# Patient Record
Sex: FEMALE | Race: BLACK OR AFRICAN AMERICAN | Employment: UNEMPLOYED | URBAN - METROPOLITAN AREA
[De-identification: names, ages, dates, MRNs, and addresses within clinical notes are randomized per-mention and may not be internally consistent; named-entity substitution may affect disease eponyms.]

---

## 2017-01-18 ENCOUNTER — APPOINTMENT (OUTPATIENT)
Dept: GENERAL RADIOLOGY | Age: 47
End: 2017-01-18
Attending: EMERGENCY MEDICINE
Payer: COMMERCIAL

## 2017-01-18 ENCOUNTER — HOSPITAL ENCOUNTER (OUTPATIENT)
Age: 47
Setting detail: OBSERVATION
Discharge: HOME | End: 2017-01-19
Attending: EMERGENCY MEDICINE | Admitting: INTERNAL MEDICINE
Payer: COMMERCIAL

## 2017-01-18 DIAGNOSIS — R07.9 CHEST PAIN, UNSPECIFIED TYPE: Primary | ICD-10-CM

## 2017-01-18 PROBLEM — Z72.0 TOBACCO ABUSE: Chronic | Status: ACTIVE | Noted: 2017-01-18

## 2017-01-18 PROBLEM — I10 HTN (HYPERTENSION): Chronic | Status: ACTIVE | Noted: 2017-01-18

## 2017-01-18 PROBLEM — E11.9 DIABETES MELLITUS TYPE 2, DIET-CONTROLLED (HCC): Chronic | Status: ACTIVE | Noted: 2017-01-18

## 2017-01-18 PROBLEM — E78.5 DYSLIPIDEMIA: Chronic | Status: ACTIVE | Noted: 2017-01-18

## 2017-01-18 LAB
ALBUMIN SERPL BCP-MCNC: 3.6 G/DL (ref 3.5–5)
ALBUMIN/GLOB SERPL: 0.8 {RATIO} (ref 1.2–3.5)
ALP SERPL-CCNC: 131 U/L (ref 50–136)
ALT SERPL-CCNC: 37 U/L (ref 12–65)
ANION GAP BLD CALC-SCNC: 8 MMOL/L (ref 7–16)
AST SERPL W P-5'-P-CCNC: 33 U/L (ref 15–37)
ATRIAL RATE: 69 BPM
BACTERIA URNS QL MICRO: ABNORMAL /HPF
BASOPHILS # BLD AUTO: 0 K/UL (ref 0–0.2)
BASOPHILS # BLD: 0 % (ref 0–2)
BILIRUB SERPL-MCNC: 0.5 MG/DL (ref 0.2–1.1)
BUN SERPL-MCNC: 10 MG/DL (ref 6–23)
CALCIUM SERPL-MCNC: 9.1 MG/DL (ref 8.3–10.4)
CALCULATED P AXIS, ECG09: 9 DEGREES
CALCULATED R AXIS, ECG10: -9 DEGREES
CALCULATED T AXIS, ECG11: 19 DEGREES
CASTS URNS QL MICRO: ABNORMAL /LPF
CHLORIDE SERPL-SCNC: 103 MMOL/L (ref 98–107)
CK SERPL-CCNC: 89 U/L (ref 21–215)
CO2 SERPL-SCNC: 27 MMOL/L (ref 21–32)
CREAT SERPL-MCNC: 0.92 MG/DL (ref 0.6–1)
D DIMER PPP FEU-MCNC: 0.27 UG/ML(FEU)
DIAGNOSIS, 93000: NORMAL
DIASTOLIC BP, ECG02: NORMAL MMHG
DIFFERENTIAL METHOD BLD: ABNORMAL
EOSINOPHIL # BLD: 0.4 K/UL (ref 0–0.8)
EOSINOPHIL NFR BLD: 3 % (ref 0.5–7.8)
EPI CELLS #/AREA URNS HPF: ABNORMAL /HPF
ERYTHROCYTE [DISTWIDTH] IN BLOOD BY AUTOMATED COUNT: 14.1 % (ref 11.9–14.6)
EST. AVERAGE GLUCOSE BLD GHB EST-MCNC: 137 MG/DL
GLOBULIN SER CALC-MCNC: 4.4 G/DL (ref 2.3–3.5)
GLUCOSE SERPL-MCNC: 110 MG/DL (ref 65–100)
HBA1C MFR BLD: 6.4 % (ref 4.8–6)
HCT VFR BLD AUTO: 44 % (ref 35.8–46.3)
HETEROPH AB SER QL: NEGATIVE
HGB BLD-MCNC: 14.3 G/DL (ref 11.7–15.4)
IMM GRANULOCYTES # BLD: 0 K/UL (ref 0–0.5)
IMM GRANULOCYTES NFR BLD AUTO: 0 % (ref 0–5)
LYMPHOCYTES # BLD AUTO: 44 % (ref 13–44)
LYMPHOCYTES # BLD: 5.9 K/UL (ref 0.5–4.6)
MCH RBC QN AUTO: 27.4 PG (ref 26.1–32.9)
MCHC RBC AUTO-ENTMCNC: 32.5 G/DL (ref 31.4–35)
MCV RBC AUTO: 84.5 FL (ref 79.6–97.8)
MONOCYTES # BLD: 0.9 K/UL (ref 0.1–1.3)
MONOCYTES NFR BLD AUTO: 7 % (ref 4–12)
NEUTS SEG # BLD: 6.3 K/UL (ref 1.7–8.2)
NEUTS SEG NFR BLD AUTO: 46 % (ref 43–78)
P-R INTERVAL, ECG05: 158 MS
PLATELET # BLD AUTO: 484 K/UL (ref 150–450)
PLATELET COMMENTS,PCOM: SLIGHT
PMV BLD AUTO: 10.2 FL (ref 10.8–14.1)
POTASSIUM SERPL-SCNC: 4.9 MMOL/L (ref 3.5–5.1)
PROT SERPL-MCNC: 8 G/DL (ref 6.3–8.2)
Q-T INTERVAL, ECG07: 392 MS
QRS DURATION, ECG06: 86 MS
QTC CALCULATION (BEZET), ECG08: 420 MS
RBC # BLD AUTO: 5.21 M/UL (ref 4.05–5.25)
RBC #/AREA URNS HPF: ABNORMAL /HPF
RBC MORPH BLD: ABNORMAL
SODIUM SERPL-SCNC: 138 MMOL/L (ref 136–145)
SYSTOLIC BP, ECG01: NORMAL MMHG
TROPONIN I SERPL-MCNC: <0.02 NG/ML (ref 0.02–0.05)
TSH SERPL DL<=0.005 MIU/L-ACNC: 1.02 UIU/ML (ref 0.36–3.74)
VENTRICULAR RATE, ECG03: 69 BPM
WBC # BLD AUTO: 13.5 K/UL (ref 4.3–11.1)
WBC MORPH BLD: ABNORMAL
WBC URNS QL MICRO: ABNORMAL /HPF

## 2017-01-18 PROCEDURE — 99152 MOD SED SAME PHYS/QHP 5/>YRS: CPT

## 2017-01-18 PROCEDURE — 99153 MOD SED SAME PHYS/QHP EA: CPT

## 2017-01-18 PROCEDURE — 80053 COMPREHEN METABOLIC PANEL: CPT | Performed by: EMERGENCY MEDICINE

## 2017-01-18 PROCEDURE — 93458 L HRT ARTERY/VENTRICLE ANGIO: CPT

## 2017-01-18 PROCEDURE — 74011250636 HC RX REV CODE- 250/636: Performed by: INTERNAL MEDICINE

## 2017-01-18 PROCEDURE — 81003 URINALYSIS AUTO W/O SCOPE: CPT | Performed by: PHYSICIAN ASSISTANT

## 2017-01-18 PROCEDURE — 85379 FIBRIN DEGRADATION QUANT: CPT | Performed by: PHYSICIAN ASSISTANT

## 2017-01-18 PROCEDURE — 85025 COMPLETE CBC W/AUTO DIFF WBC: CPT | Performed by: EMERGENCY MEDICINE

## 2017-01-18 PROCEDURE — 99285 EMERGENCY DEPT VISIT HI MDM: CPT | Performed by: PHYSICIAN ASSISTANT

## 2017-01-18 PROCEDURE — 74011250636 HC RX REV CODE- 250/636

## 2017-01-18 PROCEDURE — 74011000250 HC RX REV CODE- 250: Performed by: INTERNAL MEDICINE

## 2017-01-18 PROCEDURE — 93005 ELECTROCARDIOGRAM TRACING: CPT | Performed by: EMERGENCY MEDICINE

## 2017-01-18 PROCEDURE — 81015 MICROSCOPIC EXAM OF URINE: CPT | Performed by: PHYSICIAN ASSISTANT

## 2017-01-18 PROCEDURE — 82550 ASSAY OF CK (CPK): CPT | Performed by: PHYSICIAN ASSISTANT

## 2017-01-18 PROCEDURE — 77030004534 HC CATH ANGI DX INFN CARD -A

## 2017-01-18 PROCEDURE — 83036 HEMOGLOBIN GLYCOSYLATED A1C: CPT | Performed by: PHYSICIAN ASSISTANT

## 2017-01-18 PROCEDURE — 84443 ASSAY THYROID STIM HORMONE: CPT | Performed by: PHYSICIAN ASSISTANT

## 2017-01-18 PROCEDURE — C1769 GUIDE WIRE: HCPCS

## 2017-01-18 PROCEDURE — 86308 HETEROPHILE ANTIBODY SCREEN: CPT | Performed by: EMERGENCY MEDICINE

## 2017-01-18 PROCEDURE — 36415 COLL VENOUS BLD VENIPUNCTURE: CPT | Performed by: PHYSICIAN ASSISTANT

## 2017-01-18 PROCEDURE — 77030029997 HC DEV COM RDL R BND TELE -B

## 2017-01-18 PROCEDURE — 74011250637 HC RX REV CODE- 250/637: Performed by: INTERNAL MEDICINE

## 2017-01-18 PROCEDURE — C1894 INTRO/SHEATH, NON-LASER: HCPCS

## 2017-01-18 PROCEDURE — 77030015766

## 2017-01-18 PROCEDURE — 84484 ASSAY OF TROPONIN QUANT: CPT | Performed by: EMERGENCY MEDICINE

## 2017-01-18 PROCEDURE — 99218 HC RM OBSERVATION: CPT

## 2017-01-18 PROCEDURE — 71020 XR CHEST PA LAT: CPT

## 2017-01-18 PROCEDURE — 74011636320 HC RX REV CODE- 636/320: Performed by: INTERNAL MEDICINE

## 2017-01-18 RX ORDER — ONDANSETRON 2 MG/ML
4 INJECTION INTRAMUSCULAR; INTRAVENOUS
Status: DISCONTINUED | OUTPATIENT
Start: 2017-01-18 | End: 2017-01-19 | Stop reason: HOSPADM

## 2017-01-18 RX ORDER — NITROGLYCERIN 0.4 MG/1
0.4 TABLET SUBLINGUAL
Status: DISCONTINUED | OUTPATIENT
Start: 2017-01-18 | End: 2017-01-19 | Stop reason: HOSPADM

## 2017-01-18 RX ORDER — SODIUM CHLORIDE 0.9 % (FLUSH) 0.9 %
5-10 SYRINGE (ML) INJECTION AS NEEDED
Status: DISCONTINUED | OUTPATIENT
Start: 2017-01-18 | End: 2017-01-19 | Stop reason: HOSPADM

## 2017-01-18 RX ORDER — LIDOCAINE HYDROCHLORIDE 20 MG/ML
2-10 INJECTION, SOLUTION INFILTRATION; PERINEURAL
Status: DISCONTINUED | OUTPATIENT
Start: 2017-01-18 | End: 2017-01-19 | Stop reason: HOSPADM

## 2017-01-18 RX ORDER — OXYCODONE HYDROCHLORIDE 5 MG/1
15 TABLET ORAL
Status: DISCONTINUED | OUTPATIENT
Start: 2017-01-18 | End: 2017-01-19 | Stop reason: HOSPADM

## 2017-01-18 RX ORDER — HEPARIN SODIUM 200 [USP'U]/100ML
3 INJECTION, SOLUTION INTRAVENOUS CONTINUOUS
Status: DISCONTINUED | OUTPATIENT
Start: 2017-01-18 | End: 2017-01-18 | Stop reason: SDUPTHER

## 2017-01-18 RX ORDER — HYDROCODONE BITARTRATE AND ACETAMINOPHEN 5; 325 MG/1; MG/1
1 TABLET ORAL
Status: DISCONTINUED | OUTPATIENT
Start: 2017-01-18 | End: 2017-01-19 | Stop reason: HOSPADM

## 2017-01-18 RX ORDER — METOPROLOL SUCCINATE 50 MG/1
50 TABLET, EXTENDED RELEASE ORAL 2 TIMES DAILY
Status: DISCONTINUED | OUTPATIENT
Start: 2017-01-18 | End: 2017-01-19 | Stop reason: HOSPADM

## 2017-01-18 RX ORDER — FENTANYL CITRATE 50 UG/ML
25-75 INJECTION, SOLUTION INTRAMUSCULAR; INTRAVENOUS
Status: DISCONTINUED | OUTPATIENT
Start: 2017-01-18 | End: 2017-01-19 | Stop reason: HOSPADM

## 2017-01-18 RX ORDER — CYCLOBENZAPRINE HCL 10 MG
10 TABLET ORAL
Status: DISCONTINUED | OUTPATIENT
Start: 2017-01-18 | End: 2017-01-19 | Stop reason: HOSPADM

## 2017-01-18 RX ORDER — CITALOPRAM 10 MG/1
10 TABLET ORAL DAILY
Status: DISCONTINUED | OUTPATIENT
Start: 2017-01-19 | End: 2017-01-19 | Stop reason: HOSPADM

## 2017-01-18 RX ORDER — SODIUM CHLORIDE 9 MG/ML
100 INJECTION, SOLUTION INTRAVENOUS CONTINUOUS
Status: DISCONTINUED | OUTPATIENT
Start: 2017-01-18 | End: 2017-01-18 | Stop reason: SDUPTHER

## 2017-01-18 RX ORDER — MIDAZOLAM HYDROCHLORIDE 1 MG/ML
.5-2 INJECTION, SOLUTION INTRAMUSCULAR; INTRAVENOUS
Status: DISCONTINUED | OUTPATIENT
Start: 2017-01-18 | End: 2017-01-19 | Stop reason: HOSPADM

## 2017-01-18 RX ORDER — ACETAMINOPHEN 325 MG/1
650 TABLET ORAL
Status: DISCONTINUED | OUTPATIENT
Start: 2017-01-18 | End: 2017-01-19 | Stop reason: HOSPADM

## 2017-01-18 RX ORDER — GUAIFENESIN 100 MG/5ML
81 LIQUID (ML) ORAL DAILY
Status: DISCONTINUED | OUTPATIENT
Start: 2017-01-19 | End: 2017-01-19 | Stop reason: HOSPADM

## 2017-01-18 RX ORDER — PANTOPRAZOLE SODIUM 40 MG/1
40 TABLET, DELAYED RELEASE ORAL
Status: DISCONTINUED | OUTPATIENT
Start: 2017-01-19 | End: 2017-01-19 | Stop reason: HOSPADM

## 2017-01-18 RX ORDER — SODIUM CHLORIDE 9 MG/ML
75 INJECTION, SOLUTION INTRAVENOUS CONTINUOUS
Status: DISCONTINUED | OUTPATIENT
Start: 2017-01-18 | End: 2017-01-19 | Stop reason: HOSPADM

## 2017-01-18 RX ORDER — DICLOFENAC SODIUM 75 MG/1
75 TABLET, DELAYED RELEASE ORAL 2 TIMES DAILY
Status: DISCONTINUED | OUTPATIENT
Start: 2017-01-18 | End: 2017-01-19 | Stop reason: HOSPADM

## 2017-01-18 RX ORDER — GABAPENTIN 300 MG/1
300 CAPSULE ORAL
Status: DISCONTINUED | OUTPATIENT
Start: 2017-01-18 | End: 2017-01-19 | Stop reason: HOSPADM

## 2017-01-18 RX ORDER — SODIUM CHLORIDE 0.9 % (FLUSH) 0.9 %
5-10 SYRINGE (ML) INJECTION EVERY 8 HOURS
Status: DISCONTINUED | OUTPATIENT
Start: 2017-01-18 | End: 2017-01-18 | Stop reason: SDUPTHER

## 2017-01-18 RX ORDER — SODIUM CHLORIDE 0.9 % (FLUSH) 0.9 %
5-10 SYRINGE (ML) INJECTION EVERY 8 HOURS
Status: DISCONTINUED | OUTPATIENT
Start: 2017-01-18 | End: 2017-01-19 | Stop reason: HOSPADM

## 2017-01-18 RX ADMIN — IOVERSOL 80 ML: 741 INJECTION INTRA-ARTERIAL; INTRAVENOUS at 18:20

## 2017-01-18 RX ADMIN — LIDOCAINE HYDROCHLORIDE 60 MG: 20 INJECTION, SOLUTION INFILTRATION; PERINEURAL at 18:12

## 2017-01-18 RX ADMIN — Medication 10 ML: at 19:40

## 2017-01-18 RX ADMIN — HEPARIN SODIUM 3 UNITS/HR: 200 INJECTION, SOLUTION INTRAVENOUS at 17:58

## 2017-01-18 RX ADMIN — Medication 10 ML: at 15:59

## 2017-01-18 RX ADMIN — SODIUM CHLORIDE 100 ML/HR: 900 INJECTION, SOLUTION INTRAVENOUS at 15:59

## 2017-01-18 RX ADMIN — FENTANYL CITRATE 50 MCG: 50 INJECTION, SOLUTION INTRAMUSCULAR; INTRAVENOUS at 18:06

## 2017-01-18 RX ADMIN — GABAPENTIN 300 MG: 300 CAPSULE ORAL at 19:32

## 2017-01-18 RX ADMIN — CYCLOBENZAPRINE HYDROCHLORIDE 10 MG: 10 TABLET, FILM COATED ORAL at 19:31

## 2017-01-18 RX ADMIN — MIDAZOLAM HYDROCHLORIDE 2 MG: 1 INJECTION, SOLUTION INTRAMUSCULAR; INTRAVENOUS at 18:06

## 2017-01-18 RX ADMIN — SODIUM CHLORIDE 75 ML/HR: 900 INJECTION, SOLUTION INTRAVENOUS at 19:00

## 2017-01-18 RX ADMIN — OXYCODONE HYDROCHLORIDE 15 MG: 5 TABLET ORAL at 19:29

## 2017-01-18 RX ADMIN — METOPROLOL SUCCINATE 50 MG: 50 TABLET, EXTENDED RELEASE ORAL at 19:31

## 2017-01-18 RX ADMIN — MIDAZOLAM HYDROCHLORIDE 1 MG: 1 INJECTION, SOLUTION INTRAMUSCULAR; INTRAVENOUS at 18:12

## 2017-01-18 RX ADMIN — HEPARIN SODIUM 2 ML: 10000 INJECTION, SOLUTION INTRAVENOUS; SUBCUTANEOUS at 18:13

## 2017-01-18 NOTE — PROGRESS NOTES
Cath lab called to let us know they were putting in for transport on patient. Informed them that the patient has a infiltrated IV site and the primary is trying to start a line. The patient currently has no IV access.

## 2017-01-18 NOTE — ED TRIAGE NOTES
Per patient intermittent left sided chest pain since this Saturday that radiates through her back. Patient states she took 81mg asa today.

## 2017-01-18 NOTE — PROCEDURES
Brief Cardiac Procedure Note    Patient: Amaury Gonzalez MRN: [id number]  SSN: [id number]    YOB: 1970  Age: 40 y.o. Sex: female      Date of Procedure: 1/18/2017     Pre-procedure Diagnosis: Chest pain    Post-procedure Diagnosis: Non-cardiac chest pain    Procedure: Left Heart Catheterization    Brief Description of Procedure: As above    Performed By: Rui Kaminski MD     Assistants: None    Anesthesia: Moderate Sedation    Estimated Blood Loss: Less than 10 mL      Specimens: None    Implants: None    Findings: Normal coronary arteries    Complications: None    Recommendations: Continue medical therapy.     Signed By: Rui Kaminski MD     January 18, 2017

## 2017-01-18 NOTE — ROUTINE PROCESS
TRANSFER - OUT REPORT:    [facility]  Dr. Banerjee Members  RRA access    Versed 3mg, fentanyl 50mcg  Diagnostic cath, medical management  R band placed @ [phone] with 11ml air  Pt encouraged to stop smoking    Verbal report given to cvsd RN(name) on Toya Leong  being transferred to [facility](unit) for ordered procedure       Report consisted of patients Situation, Background, Assessment and   Recommendations(SBAR). Information from the following report(s) Procedure Summary was reviewed with the receiving nurse. Lines:       Opportunity for questions and clarification was provided.       Patient transported with:   Registered Nurse  Tech

## 2017-01-18 NOTE — IP AVS SNAPSHOT
Current Discharge Medication List  
  
Take these medications at their scheduled times Dose & Instructions Dispensing Information Comments Morning Noon Evening Bedtime CeleXA 10 mg tablet Generic drug:  citalopram  
   
Your next dose is: Today, Tomorrow Other:  ____________ Dose:  10 mg Take 10 mg by mouth daily. Refills:  0  
     
   
   
   
  
 diclofenac EC 75 mg EC tablet Commonly known as:  VOLTAREN Your next dose is: Today, Tomorrow Other:  ____________ Dose:  75 mg Take 1 Tab by mouth two (2) times a day. Quantity:  20 Tab Refills:  0  
     
   
   
   
  
 hydroCHLOROthiazide 25 mg tablet Commonly known as:  HYDRODIURIL Your next dose is: Today, Tomorrow Other:  ____________ Dose:  25 mg Take 25 mg by mouth daily. Refills:  0 NexIUM 20 mg capsule Generic drug:  esomeprazole Your next dose is: Today, Tomorrow Other:  ____________ Dose:  20 mg Take 20 mg by mouth daily. Refills:  0  
     
   
   
   
  
 TOPROL  mg tablet Generic drug:  metoprolol succinate Your next dose is: Today, Tomorrow Other:  ____________ Dose:  50 mg Take 50 mg by mouth two (2) times a day. Refills:  0 Take these medications as needed Dose & Instructions Dispensing Information Comments Morning Noon Evening Bedtime FLEXERIL 10 mg tablet Generic drug:  cyclobenzaprine Your next dose is: Today, Tomorrow Other:  ____________ Dose:  10 mg Take 10 mg by mouth three (3) times daily as needed for Muscle Spasm(s). Refills:  0  
     
   
   
   
  
 gabapentin 300 mg capsule Commonly known as:  NEURONTIN Your next dose is: Today, Tomorrow Other:  ____________ Dose:  300 mg Take 300 mg by mouth nightly as needed. Refills:  0 oxyCODONE IR 15 mg immediate release tablet Commonly known as:  OXY-IR Your next dose is: Today, Tomorrow Other:  ____________ Dose:  15 mg Take 15 mg by mouth four (4) times daily as needed for Pain. Refills:  0

## 2017-01-18 NOTE — ED PROVIDER NOTES
HPI Comments: Patient is here with left sided chest pain and pressure that started on Saturday, 01/14/17, 4 days ago. She states it runs into her left arm and up into her neck. She has had diaphoresis with it. It started again this am at 8 and again when she was in the waiting room here but is resolved now. 11/21/14 Patient had an SA node ablation by Dr. José Miguel Carroll and has had very few symptoms since then with her \"heart racing. \" She states they told her that her thyroid was normal and her PCP is Dr. Yosi Gar. She is not having any fever, abdominal pain, nausea, vomiting, chills, swelling of her arms or legs or weakness. She was ambulatory to the stretcher without difficulty. Patients father and 2 1/2 brothers have had MI's, mother had some \"mild heart disease but no surgery. \"    Patient is a 40 y.o. female presenting with chest pain. The history is provided by the patient. Chest Pain (Angina)    Associated symptoms include diaphoresis. Pertinent negatives include no cough, no fever, no palpitations and no shortness of breath.         Past Medical History:   Diagnosis Date    CAD (coronary artery disease)      fast heart rate     Chronic pain      Back    Diabetes (HCC)      type 2, aver= 135- feels badlyat low -90--- high 200 - resolved with weight loss    GERD (gastroesophageal reflux disease)     Hypertension     Ill-defined condition      DDD    Other ill-defined conditions(799.89)      pain rt breast    Other ill-defined conditions(799.89)      chronic back pain    Psychiatric disorder     V tach ([facility] [address]        Past Surgical History:   Procedure Laterality Date    Hx tubal ligation      Hx orthopaedic       left hand  ganglion    Pr breast surgery procedure unlisted       excision right breast ducts         Family History:   Problem Relation Age of Onset    Diabetes Mother     Pseudocholinesterase Deficiency Neg Hx     Malignant Hyperthermia Neg Hx     Delayed Awakening Neg Hx     Post-op Nausea/Vomiting Neg Hx     Emergence Delirium Neg Hx     Post-op Cognitive Dysfunction Neg Hx     Other Neg Hx     Breast Cancer Maternal Aunt 28       Social History     Social History    Marital status:      Spouse name: N/A    Number of children: N/A    Years of education: N/A     Occupational History    Not on file. Social History Main Topics    Smoking status: Current Every Day Smoker     Packs/day: 0.50     Years: 15.00    Smokeless tobacco: Never Used    Alcohol use No    Drug use: No    Sexual activity: Not on file     Other Topics Concern    Not on file     Social History Narrative         ALLERGIES: Naproxen and Ultram [tramadol]    Review of Systems   Constitutional: Positive for diaphoresis. Negative for chills and fever. HENT: Negative. Eyes: Negative. Respiratory: Negative. Negative for apnea, cough, choking, chest tightness, shortness of breath, wheezing and stridor. Cardiovascular: Positive for chest pain. Negative for palpitations and leg swelling. Gastrointestinal: Negative. Genitourinary: Negative. Musculoskeletal: Negative. Skin: Negative. Neurological: Negative. Psychiatric/Behavioral: Negative. All other systems reviewed and are negative. Vitals:    01/18/17 0941   BP: 140/68   Pulse: 71   Resp: 16   Temp: 98.3 °F (36.8 °C)   SpO2: 98%   Weight: 99.8 kg (220 lb)   Height: 5' 6\" (1.676 m)            Physical Exam   Constitutional: She is oriented to person, place, and time. She appears well-developed and well-nourished. HENT:   Head: Normocephalic and atraumatic. Right Ear: External ear normal.   Left Ear: External ear normal.   Nose: Nose normal.   Mouth/Throat: Oropharynx is clear and moist.   Eyes: Conjunctivae and EOM are normal. Pupils are equal, round, and reactive to light. Neck: Normal range of motion. Neck supple. Cardiovascular: Normal rate, regular rhythm, normal heart sounds and intact distal pulses. Pulmonary/Chest: Effort normal and breath sounds normal. No respiratory distress. She has no wheezes. She has no rales. She exhibits no tenderness. Abdominal: Soft. Bowel sounds are normal.   Musculoskeletal: Normal range of motion. She exhibits no edema. Neurological: She is alert and oriented to person, place, and time. She has normal reflexes. Skin: Skin is warm and dry. No erythema. Psychiatric: She has a normal mood and affect. Her behavior is normal. Judgment and thought content normal.   Nursing note and vitals reviewed. MDM  Number of Diagnoses or Management Options     Amount and/or Complexity of Data Reviewed  Clinical lab tests: ordered and reviewed  Tests in the radiology section of CPT®: ordered and reviewed  Discuss the patient with other providers: yes (Dr. Cece Paulson)    Risk of Complications, Morbidity, and/or Mortality  Presenting problems: moderate  Diagnostic procedures: moderate  Management options: moderate      ED Course       Procedures    12:12 PM Spoke with Dr. Cece Paulson regarding patient. He went to see patient as well. 1:45 PM Cardiology was paged and they are here to see patient. The patient was observed in the ED.     Results Reviewed:      Recent Results (from the past 24 hour(s))   EKG, 12 LEAD, INITIAL    Collection Time: 01/18/17  9:41 AM   Result Value Ref Range    Systolic BP  mmHg    Diastolic BP  mmHg    Ventricular Rate 69 BPM    Atrial Rate 69 BPM    P-R Interval 158 ms    QRS Duration 86 ms    Q-T Interval 392 ms    QTC Calculation (Bezet) 420 ms    Calculated P Axis 9 degrees    Calculated R Axis -9 degrees    Calculated T Axis 19 degrees    Diagnosis Normal sinus rhythm  Normal ECG      CBC WITH AUTOMATED DIFF    Collection Time: 01/18/17  9:45 AM   Result Value Ref Range    WBC 13.5 (H) 4.3 - 11.1 K/uL    RBC 5.21 4.05 - 5.25 M/uL    HGB 14.3 11.7 - 15.4 g/dL    HCT 44.0 35.8 - 46.3 %    MCV 84.5 79.6 - 97.8 FL    MCH 27.4 26.1 - 32.9 PG    MCHC 32.5 31.4 - 35.0 g/dL    RDW 14.1 11.9 - 14.6 %    PLATELET 462 (H) 087 - 450 K/uL    MPV 10.2 (L) 10.8 - 14.1 FL    NEUTROPHILS 46 43 - 78 %    LYMPHOCYTES 44 13 - 44 %    MONOCYTES 7 4.0 - 12.0 %    EOSINOPHILS 3 0.5 - 7.8 %    BASOPHILS 0 0.0 - 2.0 %    IMMATURE GRANULOCYTES 0 0.0 - 5.0 %    ABS. NEUTROPHILS 6.3 1.7 - 8.2 K/UL    ABS. LYMPHOCYTES 5.9 (H) 0.5 - 4.6 K/UL    ABS. MONOCYTES 0.9 0.1 - 1.3 K/UL    ABS. EOSINOPHILS 0.4 0.0 - 0.8 K/UL    ABS. BASOPHILS 0.0 0.0 - 0.2 K/UL    ABS. IMM. GRANS. 0.0 0.0 - 0.5 K/UL    RBC COMMENTS NORMOCYTIC/NORMOCHROMIC      WBC COMMENTS ATYPICAL LYMPHOCYTES PRESENT      PLATELET COMMENTS SLIGHT      DF AUTOMATED     METABOLIC PANEL, COMPREHENSIVE    Collection Time: 01/18/17  9:45 AM   Result Value Ref Range    Sodium 138 136 - 145 mmol/L    Potassium 4.9 3.5 - 5.1 mmol/L    Chloride 103 98 - 107 mmol/L    CO2 27 21 - 32 mmol/L    Anion gap 8 7 - 16 mmol/L    Glucose 110 (H) 65 - 100 mg/dL    BUN 10 6 - 23 MG/DL    Creatinine 0.92 0.6 - 1.0 MG/DL    GFR est AA >60 >60 ml/min/1.73m2    GFR est non-AA >60 >60 ml/min/1.73m2    Calcium 9.1 8.3 - 10.4 MG/DL    Bilirubin, total 0.5 0.2 - 1.1 MG/DL    ALT 37 12 - 65 U/L    AST 33 15 - 37 U/L    Alk.  phosphatase 131 50 - 136 U/L    Protein, total 8.0 6.3 - 8.2 g/dL    Albumin 3.6 3.5 - 5.0 g/dL    Globulin 4.4 (H) 2.3 - 3.5 g/dL    A-G Ratio 0.8 (L) 1.2 - 3.5     TROPONIN I    Collection Time: 01/18/17  9:45 AM   Result Value Ref Range    Troponin-I, Qt. <0.02 (L) 0.02 - 0.05 NG/ML   D DIMER    Collection Time: 01/18/17  9:45 AM   Result Value Ref Range    D DIMER 0.27 <0.55 ug/ml(FEU)   TSH 3RD GENERATION    Collection Time: 01/18/17  9:45 AM   Result Value Ref Range    TSH 1.020 0.358 - 3.740 uIU/mL   URINE MICROSCOPIC    Collection Time: 01/18/17 11:55 AM   Result Value Ref Range    WBC 5-10 0 /hpf    RBC 5-10 0 /hpf    Epithelial cells 5-10 0 /hpf    Bacteria 1+ (H) 0 /hpf    Casts 0-3 0 /lpf   TROPONIN I    Collection Time: 01/18/17 12:38 PM   Result Value Ref Range    Troponin-I, Qt. <0.02 (L) 0.02 - 0.05 NG/ML   MONONUCLEOSIS SCREEN    Collection Time: 01/18/17 12:38 PM   Result Value Ref Range    Mononucleosis screen NEGATIVE  NEG       Patient admitted by cardiology.

## 2017-01-18 NOTE — IP AVS SNAPSHOT
Francia Volodymyr 
 
 
 [address] [facility] [address] [facility] 
[phone] Patient: Landen Cunningham MRN: [id number] MFO:[id number]/80/[id number] You are allergic to the following Allergen Reactions Naproxen Itching Ultram (Tramadol) Itching Recent Documentation Height Weight BMI OB Status Smoking Status 1.676 m 108.4 kg 38.58 kg/m2 Postmenopausal Current Every Day Smoker Emergency Contacts Name Discharge Info Relation Home Work Mobile Lan Church  Spouse [3] [phone] About your hospitalization You were admitted on:  January 18, 2017 You last received care in the:  [facility] 2 CV STEPDOWN You were discharged on:  January 19, 2017 Unit phone number:  [phone] Why you were hospitalized Your primary diagnosis was:  Chest Pain Your diagnoses also included:  Htn (Hypertension), Dyslipidemia, Diabetes Mellitus Type 2, Diet-Controlled (Hcc), Tobacco Abuse Providers Seen During Your Hospitalizations Provider Role Specialty Primary office phone Mynor Torres MD Attending Provider Emergency Medicine [phone] Ariana Sherman MD Attending Provider Cardiology [phone] Your Primary Care Physician (PCP) Primary Care Physician Office Phone Office Fax  
 [address] [facility] [place] [phone] Follow-up Information Follow up With Details Comments Contact Info Kelsey Poe MD  Feb 6 at [address] [address] 
[phone] Juan Grace MD   [address] [place] [address] 
[phone] Your Appointments Monday February 06, 2017  9:00 AM [facility] HOSPITAL FOLLOW-UP with Kelsey Poe MD  
[facility] ([address]) [address]  
[address] [place] [address]  
[phone] Current Discharge Medication List  
  
 CONTINUE these medications which have NOT CHANGED Dose & Instructions Dispensing Information Comments Morning Noon Evening Bedtime CeleXA 10 mg tablet Generic drug:  citalopram  
   
Your next dose is: Today, Tomorrow Other:  _________ Dose:  10 mg Take 10 mg by mouth daily. Refills:  0  
     
   
   
   
  
 diclofenac EC 75 mg EC tablet Commonly known as:  VOLTAREN Your next dose is: Today, Tomorrow Other:  _________ Dose:  75 mg Take 1 Tab by mouth two (2) times a day. Quantity:  20 Tab Refills:  0  
     
   
   
   
  
 FLEXERIL 10 mg tablet Generic drug:  cyclobenzaprine Your next dose is: Today, Tomorrow Other:  _________ Dose:  10 mg Take 10 mg by mouth three (3) times daily as needed for Muscle Spasm(s). Refills:  0  
     
   
   
   
  
 gabapentin 300 mg capsule Commonly known as:  NEURONTIN Your next dose is: Today, Tomorrow Other:  _________ Dose:  300 mg Take 300 mg by mouth nightly as needed. Refills:  0  
     
   
   
   
  
 hydroCHLOROthiazide 25 mg tablet Commonly known as:  HYDRODIURIL Your next dose is: Today, Tomorrow Other:  _________ Dose:  25 mg Take 25 mg by mouth daily. Refills:  0 NexIUM 20 mg capsule Generic drug:  esomeprazole Your next dose is: Today, Tomorrow Other:  _________ Dose:  20 mg Take 20 mg by mouth daily. Refills:  0  
     
   
   
   
  
 oxyCODONE IR 15 mg immediate release tablet Commonly known as:  OXY-IR Your next dose is: Today, Tomorrow Other:  _________ Dose:  15 mg Take 15 mg by mouth four (4) times daily as needed for Pain. Refills:  0  
     
   
   
   
  
 TOPROL  mg tablet Generic drug:  metoprolol succinate Your next dose is: Today, Tomorrow Other:  _________  Dose:  50 mg  
 Take 50 mg by mouth two (2) times a day. Refills:  0 Discharge Instructions DISCHARGE SUMMARY from Nurse The following personal items are in your possession at time of discharge: 
 
Dental Appliances: None Home Medications: None Jewelry: With patient Clothing: With patient Other Valuables: None PATIENT INSTRUCTIONS: 
 
 
F-face looks uneven A-arms unable to move or move unevenly S-speech slurred or non-existent T-time-call 911 as soon as signs and symptoms begin-DO NOT go Back to bed or wait to see if you get better-TIME IS BRAIN. Warning Signs of HEART ATTACK Call 911 if you have these symptoms: 
? Chest discomfort. Most heart attacks involve discomfort in the center of the chest that lasts more than a few minutes, or that goes away and comes back. It can feel like uncomfortable pressure, squeezing, fullness, or pain. ? Discomfort in other areas of the upper body. Symptoms can include pain or discomfort in one or both arms, the back, neck, jaw, or stomach. ? Shortness of breath with or without chest discomfort. ? Other signs may include breaking out in a cold sweat, nausea, or lightheadedness. Don't wait more than five minutes to call [address]! Fast action can save your life. Calling 911 is almost always the fastest way to get lifesaving treatment. Emergency Medical Services staff can begin treatment when they arrive  up to an hour sooner than if someone gets to the hospital by car. The discharge information has been reviewed with the patient. The patient verbalized understanding. Discharge medications reviewed with the patient and appropriate educational materials and side effects teaching were provided. Discharge Instructions Attachments/References CHEST PAIN (ENGLISH) HEALTHY DIET: HEART (ENGLISH) HEART ATTACK: MEDICINE TO REDUCE RISK (ENGLISH) PCI (PERCUTANEOUS CORONARY INTERVENTION): POST-OP (ENGLISH) Discharge Orders None [employer] Announcement We are excited to announce that we are making your provider's discharge notes available to you in [employer]. You will see these notes when they are completed and signed by the physician that discharged you from your recent hospital stay. If you have any questions or concerns about any information you see in [employer], please call the Health Information Department where you were seen or reach out to your Primary Care Provider for more information about your plan of care. Introducing [facility]! Dalton Antonio introduces [employer] patient portal. Now you can access parts of your medical record, email your doctor's office, and request medication refills online. 1. In your internet browser, go to [email or web address] 2. Click on the First Time User? Click Here link in the Sign In box. You will see the New Member Sign Up page. 3. Enter your [employer] Access Code exactly as it appears below. You will not need to use this code after youve completed the sign-up process. If you do not sign up before the expiration date, you must request a new code. · [employer] Access Code: [email or web address] Expires: 4/18/2017  9:51 AM 
 
4. Enter the last four digits of your Social Security Number (xxxx) and Date of Birth (mm/dd/yyyy) as indicated and click Submit. You will be taken to the next sign-up page. 5. Create a [employer] ID. This will be your [employer] login ID and cannot be changed, so think of one that is secure and easy to remember. 6. Create a [employer] password. You can change your password at any time. 7. Enter your Password Reset Question and Answer. This can be used at a later time if you forget your password. 8. Enter your e-mail address.  You will receive e-mail notification when new information is available in [employer]. 9. Click Sign Up. You can now view and download portions of your medical record. 10. Click the Download Summary menu link to download a portable copy of your medical information. If you have questions, please visit the Frequently Asked Questions section of the [employer] website. Remember, [employer] is NOT to be used for urgent needs. For medical emergencies, dial 911. Now available from your iPhone and Android! General Information Please provide this summary of care documentation to your next provider. Patient Signature:  ____________________________________________________________ Date:  ____________________________________________________________  
  
Taurus Artist Provider Signature:  ____________________________________________________________ Date:  ____________________________________________________________ More Information Chest Pain: Care Instructions Your Care Instructions There are many things that can cause chest pain. Some are not serious and will get better on their own in a few days. But some kinds of chest pain need more testing and treatment. Your doctor may have recommended a follow-up visit in the next 8 to 12 hours. If you are not getting better, you may need more tests or treatment. Even though your doctor has released you, you still need to watch for any problems. The doctor carefully checked you, but sometimes problems can develop later. If you have new symptoms or if your symptoms do not get better, get medical care right away. If you have worse or different chest pain or pressure that lasts more than 5 minutes or you passed out (lost consciousness), call 911 or seek other emergency help right away. A medical visit is only one step in your treatment.  Even if you feel better, you still need to do what your doctor recommends, such as going to all suggested follow-up appointments and taking medicines exactly as directed. This will help you recover and help prevent future problems. How can you care for yourself at home? · Rest until you feel better. · Take your medicine exactly as prescribed. Call your doctor if you think you are having a problem with your medicine. · Do not drive after taking a prescription pain medicine. When should you call for help? Call 911 if: 
· You passed out (lost consciousness). · You have severe difficulty breathing. · You have symptoms of a heart attack. These may include: ¨ Chest pain or pressure, or a strange feeling in your chest. 
¨ Sweating. ¨ Shortness of breath. ¨ Nausea or vomiting. ¨ Pain, pressure, or a strange feeling in your back, neck, jaw, or upper belly or in one or both shoulders or arms. ¨ Lightheadedness or sudden weakness. ¨ A fast or irregular heartbeat. After you call 911, the  may tell you to chew 1 adult-strength or 2 to 4 low-dose aspirin. Wait for an ambulance. Do not try to drive yourself. Call your doctor today if: 
· You have any trouble breathing. · Your chest pain gets worse. · You are dizzy or lightheaded, or you feel like you may faint. · You are not getting better as expected. · You are having new or different chest pain. Where can you learn more? Go to http://anibal-felix.info/. Enter A120 in the search box to learn more about \"Chest Pain: Care Instructions. \" Current as of: May 27, 2016 Content Version: 11.1 © 2345-5535 [employer]DNA. Care instructions adapted under license by [employer] (which disclaims liability or warranty for this information). If you have questions about a medical condition or this instruction, always ask your healthcare professional. [address] any warranty or liability for your use of this information. Heart-Healthy Diet: Care Instructions Your Care Instructions A heart-healthy diet has lots of vegetables, fruits, nuts, beans, and whole grains, and is low in salt. It limits foods that are high in saturated fat, such as meats, cheeses, and fried foods. It may be hard to change your diet, but even small changes can lower your risk of heart attack and heart disease. Follow-up care is a key part of your treatment and safety. Be sure to make and go to all appointments, and call your doctor if you are having problems. It's also a good idea to know your test results and keep a list of the medicines you take. How can you care for yourself at home? Watch your portions · Learn what a serving is. A \"serving\" and a \"portion\" are not always the same thing. Make sure that you are not eating larger portions than are recommended. For example, a serving of pasta is ½ cup. A serving size of meat is 2 to 3 ounces. A 3-ounce serving is about the size of a deck of cards. Measure serving sizes until you are good at [place]" them. Keep in mind that restaurants often serve portions that are 2 or 3 times the size of one serving. · To keep your energy level up and keep you from feeling hungry, eat often but in smaller portions. · Eat only the number of calories you need to stay at a healthy weight. If you need to lose weight, eat fewer calories than your body burns (through exercise and other physical activity). Eat more fruits and vegetables · Eat a variety of fruit and vegetables every day. Dark green, deep orange, red, or yellow fruits and vegetables are especially good for you. Examples include spinach, carrots, peaches, and berries. · Keep carrots, celery, and other veggies handy for snacks. Buy fruit that is in season and store it where you can see it so that you will be tempted to eat it. · Cook dishes that have a lot of veggies in them, such as stir-fries and soups. Limit saturated and trans fat · Read food labels, and try to avoid saturated and trans fats. They increase your risk of heart disease. Trans fat is found in many processed foods such as cookies and crackers. · Use olive or canola oil when you cook. Try cholesterol-lowering spreads, such as Benecol or Take Control. · Bake, broil, grill, or steam foods instead of frying them. · Choose lean meats instead of high-fat meats such as hot dogs and sausages. Cut off all visible fat when you prepare meat. · Eat fish, skinless poultry, and meat alternatives such as soy products instead of high-fat meats. Soy products, such as tofu, may be especially good for your heart. · Choose low-fat or fat-free milk and dairy products. Eat fish · Eat at least two servings of fish a week. Certain fish, such as salmon and tuna, contain omega-3 fatty acids, which may help reduce your risk of heart attack. Eat foods high in fiber · Eat a variety of grain products every day. Include whole-grain foods that have lots of fiber and nutrients. Examples of whole-grain foods include oats, whole wheat bread, and brown rice. · Buy whole-grain breads and cereals, instead of white bread or pastries. Limit salt and sodium · Limit how much salt and sodium you eat to help lower your blood pressure. · Taste food before you salt it. Add only a little salt when you think you need it. With time, your taste buds will adjust to less salt. · Eat fewer snack items, fast foods, and other high-salt, processed foods. Check food labels for the amount of sodium in packaged foods. · Choose low-sodium versions of canned goods (such as soups, vegetables, and beans). Limit sugar · Limit drinks and foods with added sugar. These include candy, desserts, and soda pop. Limit alcohol · Limit alcohol to no more than 2 drinks a day for men and 1 drink a day for women. Too much alcohol can cause health problems. When should you call for help? Watch closely for changes in your health, and be sure to contact your doctor if: 
· You would like help planning heart-healthy meals. Where can you learn more? Go to http://anibal-felix.info/. Enter V137 in the search box to learn more about \"Heart-Healthy Diet: Care Instructions. \" Current as of: January 27, 2016 Content Version: 11.1 © 9997-6566 [employer]. Care instructions adapted under license by [employer] (which disclaims liability or warranty for this information). If you have questions about a medical condition or this instruction, always ask your healthcare professional. [place] [address] any warranty or liability for your use of this information. Reducing Heart Attack Risk With Daily Medicine: Care Instructions Your Care Instructions Heart disease is the number one cause of death. If you are at risk for heart disease, there are many medicines that can reduce your risk. These include: · ACE inhibitors. These are a type of blood pressure medicine. They can reduce the risk of heart attacks and strokes if you are at high risk. · Statin medicines. These lower cholesterol. They can also reduce the risk of heart disease and strokes. · Aspirin. It can help certain people lower their risk of a heart attack or stroke. · Beta-blocker medicines. These are a type of blood pressure and heart medicine. They can reduce the chance of early death if you have had a heart attack. All medicines can cause side effects. So it is important to understand the pros and cons of any medicine you take. It is also important to take your medicines exactly as your doctor tells you to. Follow-up care is a key part of your treatment and safety. Be sure to make and go to all appointments, and call your doctor if you are having problems. It's also a good idea to know your test results and keep a list of the medicines you take. ACE inhibitors ACE (angiotensin-converting enzyme) inhibitors are used for three main reasons. They lower blood pressure, protect the kidneys, and prevent heart attacks and strokes. Examples include benazepril (Lotensin), lisinopril (Prinivil, Zestril), and ramipril (Altace). Before you start taking an ACE inhibitor, make sure your doctor knows if: 
· You are taking a water pill (diuretic). · You are taking potassium pills or using salt substitutes. · You are pregnant or breastfeeding. · You have had a kidney transplant or other kidney problems. ACE inhibitors can cause side effects. Call your doctor right away if you have: · Trouble breathing. · Swelling in your face, head, neck, or tongue. · Dizziness or lightheadedness. · A dry cough. Statins Statins lower cholesterol. Examples include atorvastatin (Lipitor), lovastatin (Mevacor), pravastatin (Pravachol), and simvastatin (Zocor). Before you start taking a statin, make sure your doctor knows if: 
· You have had a kidney transplant or other kidney problems. · You have liver disease. · You take any other prescription medicine, over-the-counter medicine, vitamins, supplements, or herbal remedies. · You are pregnant or breastfeeding. Statins can cause side effects. Call your doctor right away if you have: · New, severe muscle aches. · Brown urine. Aspirin Taking an aspirin every day can lower your risk for a heart attack. A heart attack occurs when a blood vessel in the heart gets blocked. When this happens, oxygen can't get to the heart muscle, and part of the heart dies. Aspirin can help prevent blood clots that can block the blood vessels. Talk to your doctor before you start taking aspirin every day. He or she may recommend that you take one low-dose aspirin (81 mg) tablet each day, with a meal and a full glass of water. Taking aspirin isn't right for everyone, because it can cause serious bleeding. And you may not be able to use aspirin if you: · Have asthma. · Have an ulcer or other stomach problem. · Take some other medicine (called a blood thinner) that prevents blood clots. · Are allergic to aspirin. Before having a surgery or procedure, tell your doctor or dentist that you take aspirin. He or she will tell you if you should stop taking aspirin beforehand. Make sure that you understand exactly what your doctor wants you to do. Aspirin can cause side effects. Call your doctor right away if you have: · Unusual bleeding or bruising. · Nausea, vomiting, or heartburn. · Black or bloody stools. Beta-blockers Beta-blockers are used for three main reasons. They lower blood pressure, relieve angina symptoms (such as chest pain or pressure), and reduce the chances of a second heart attack. They include atenolol (Tenormin), carvedilol (Coreg), and metoprolol (Lopressor). Before you start taking a beta-blocker, make sure your doctor knows if you have: · Severe asthma or frequent asthma attacks. · A very slow pulse (less than 55 beats a minute). Beta-blockers can cause side effects. Call your doctor right away if you have: · Wheezing or trouble breathing. · Dizziness or lightheadedness. · Asthma that gets worse. When should you call for help? Call 911 anytime you think you may need emergency care. For example, call if: 
· You passed out (lost consciousness). Call your doctor now or seek immediate medical care if: 
· You are wheezing or have trouble breathing. · You have swelling in your face, head, neck, or tongue. · You are dizzy or lightheaded, or you feel like you may faint. · You have severe muscle pain, weakness, or brown urine. · You have vision problems. · You have new bruises or blood spots under your skin. · Your stools are black and tarlike or have streaks of blood. Watch closely for changes in your health, and be sure to contact your doctor if: 
· You have ringing in your ears. · You feel very tired. · You have gas, constipation, or an upset stomach. Where can you learn more? Go to http://anibal-felix.info/. Enter R428 in the search box to learn more about \"Reducing Heart Attack Risk With Daily Medicine: Care Instructions. \" Current as of: March 28, 2016 Content Version: 11.1 © 3043-7295 [employer]. Care instructions adapted under license by [employer] (which disclaims liability or warranty for this information). If you have questions about a medical condition or this instruction, always ask your healthcare professional. [place] [address] any warranty or liability for your use of this information. Percutaneous Coronary Intervention: What to Expect at [facility] Your Recovery Percutaneous coronary intervention (PCI) is the name for procedures that are used to open a narrowed or blocked coronary artery. The two most common PCI procedures are coronary angioplasty and coronary stent placement. Your groin or arm may have a bruise and feel sore for a day or two after a percutaneous coronary intervention (PCI). You can do light activities around the house, but nothing strenuous for several days. This care sheet gives you a general idea about how long it will take for you to recover. But each person recovers at a different pace. Follow the steps below to get better as quickly as possible. How can you care for yourself at home? Activity · Do not do strenuous exercise and do not lift, pull, or push anything heavy until your doctor says it is okay. This may be for a day or two. You can walk around the house and do light activity, such as cooking. · You may shower 24 to 48 hours after the procedure, if your doctor okays it. Pat the incision dry. Do not take a bath for 1 week, or until your doctor tells you it is okay. · If the catheter was placed in your groin, try not to walk up stairs for the first couple of days. · If the catheter was placed in your arm near your wrist, do not bend your wrist deeply for the first couple of days. Be careful using your hand to get into and out of a chair or bed. · If your doctor recommends it, get more exercise. Walking is a good choice. Bit by bit, increase the amount you walk every day. Try for at least 30 minutes on most days of the week. Diet · Drink plenty of fluids to help your body flush out the dye. If you have kidney, heart, or liver disease and have to limit fluids, talk with your doctor before you increase the amount of fluids you drink. · Keep eating a heart-healthy diet that has lots of fruits, vegetables, and whole grains. If you have not been eating this way, talk to your doctor. You also may want to talk to a dietitian. This expert can help you to learn about healthy foods and plan meals. Medicines · Your doctor will tell you if and when you can restart your medicines. He or she will also give you instructions about taking any new medicines. · If you take blood thinners, such as warfarin (Coumadin), clopidogrel (Plavix), or aspirin, be sure to talk to your doctor. He or she will tell you if and when to start taking those medicines again. Make sure that you understand exactly what your doctor wants you to do. · Your doctor will prescribe blood-thinning medicines. You will likely take aspirin plus another antiplatelet, such as clopidogrel (Plavix). It is very important that you take these medicines exactly as directed. These medicines help keep the coronary artery open and reduce your risk of a heart attack. · Call your doctor if you think you are having a problem with your medicine. Care of the catheter site · For 1 or 2 days, keep a bandage over the spot where the catheter was inserted. The bandage probably will fall off in this time.  
· Put ice or a cold pack on the area for 10 to 20 minutes at a time to help with soreness or swelling. Put a thin cloth between the ice and your skin. Follow-up care is a key part of your treatment and safety. Be sure to make and go to all appointments, and call your doctor if you are having problems. It's also a good idea to know your test results and keep a list of the medicines you take. When should you call for help? Call 911 anytime you think you may need emergency care. For example, call if: 
· You passed out (lost consciousness). · You have severe trouble breathing. · You have sudden chest pain and shortness of breath, or you cough up blood. · You have symptoms of a heart attack, such as: ¨ Chest pain or pressure. ¨ Sweating. ¨ Shortness of breath. ¨ Nausea or vomiting. ¨ Pain that spreads from the chest to the neck, jaw, or one or both shoulders or arms. ¨ Dizziness or lightheadedness. ¨ A fast or uneven pulse. After calling 911, chew 1 adult-strength aspirin. Wait for an ambulance. Do not try to drive yourself. · You have been diagnosed with angina, and you have angina symptoms that do not go away with rest or are not getting better within 5 minutes after you take one dose of nitroglycerin. Call your doctor now or seek immediate medical care if: 
· You are bleeding from the area where the catheter was put in your artery. · You have a fast-growing, painful lump at the catheter site. · You have signs of infection, such as: 
¨ Increased pain, swelling, warmth, or redness. ¨ Red streaks leading from the catheter site. ¨ Pus draining from the catheter site. ¨ A fever. · Your leg or arm looks blue or feels cold, numb, or tingly. Watch closely for changes in your health, and be sure to contact your doctor if you have any problems. Where can you learn more? Go to http://anibal-felix.info/. Enter U468 in the search box to learn more about \"Percutaneous Coronary Intervention: What to Expect at Home. \" Current as of: January 27, 2016 Content Version: 11.1 © 9980-9259 [employer]. Care instructions adapted under license by [employer] (which disclaims liability or warranty for this information). If you have questions about a medical condition or this instruction, always ask your healthcare professional. [address] any warranty or liability for your use of this information.

## 2017-01-18 NOTE — PROGRESS NOTES
Pt returned to floor from cath lab. TR band in place to R radial cath site. No bleeding or hematoma noted. Instructed patient on activity restrictions to right wrist.  Patient voices understanding.

## 2017-01-18 NOTE — H&P
[facility] Cardiology History & Physical      Date of  Admission: 1/18/2017 11:08 AM     Primary Care Physician: Dr. Yosi Gar  Primary Cardiologist: Dr. José Miguel Carroll  Referring Physician: Dr. Herrera Saleh  Admitting Physician: Dr. Shira Robin    CC: Chest pain    HPI:  Ansley Mack is a 40 y.o. AAF with h/o SVT s/p ablation 2014, HTN, dyslipidemia, tobacco abuse 1/2 PPD for 20+ years, DM II- diet controlled and strong family h/o premature CAD who presents to the ER with c/o chest pain. She reports while sitting she developed sudden onset of chest pressure on Saturday that radiated to her left axilla, arm and back with associated diaphoresis and SOB. She reports she has had several episodes since then and again this morning while sitting at work. She reports episodes last 15-30 minutes and are not exertional as they have occurred at rest. She denies N/V, palpitations, LE swelling, orthopnea or syncope. In the ER, ECG shows NSR w/o acute changes and troponin is negative x 2.      Past Medical History   Diagnosis Date    CAD (coronary artery disease)      fast heart rate     Chronic pain      Back    Diabetes (HCC)      type 2, aver= 135- feels badlyat low -90--- high 200 - resolved with weight loss    Diabetes mellitus type 2, diet-controlled ([address] 1/18/2017    GERD (gastroesophageal reflux disease)     Hypertension     Ill-defined condition      DDD    Other ill-defined conditions(799.89)      pain rt breast    Other ill-defined conditions(799.89)      chronic back pain    Psychiatric disorder     V tach [facility])       Past Surgical History   Procedure Laterality Date    Hx tubal ligation      Hx orthopaedic       left hand  ganglion    Pr breast surgery procedure unlisted       excision right breast ducts       Allergies   Allergen Reactions    Naproxen Itching    Ultram [Tramadol] Itching      Social History     Social History    Marital status:      Spouse name: N/A    Number of children: N/A  Years of education: N/A     Occupational History    Not on file. Social History Main Topics    Smoking status: Current Every Day Smoker     Packs/day: 0.50     Years: 15.00    Smokeless tobacco: Never Used    Alcohol use No    Drug use: No    Sexual activity: Not on file     Other Topics Concern    Not on file     Social History Narrative     Family History   Problem Relation Age of Onset    Diabetes Mother     Pseudocholinesterase Deficiency Neg Hx     Malignant Hyperthermia Neg Hx     Delayed Awakening Neg Hx     Post-op Nausea/Vomiting Neg Hx     Emergence Delirium Neg Hx     Post-op Cognitive Dysfunction Neg Hx     Other Neg Hx     Breast Cancer Maternal Aunt 35        No current facility-administered medications for this encounter. Current Outpatient Prescriptions   Medication Sig    diclofenac EC (VOLTAREN) 75 mg EC tablet Take 1 Tab by mouth two (2) times a day.  oxyCODONE IR (OXY-IR) 15 mg immediate release tablet Take 15 mg by mouth four (4) times daily as needed for Pain.  hydrochlorothiazide (HYDRODIURIL) 25 mg tablet Take 25 mg by mouth daily.  citalopram (CELEXA) 10 mg tablet Take 10 mg by mouth daily.  gabapentin (NEURONTIN) 300 mg capsule Take 300 mg by mouth nightly as needed.  metoprolol-XL (TOPROL XL) 100 mg XL tablet Take 50 mg by mouth two (2) times a day.  esomeprazole (NEXIUM) 20 mg capsule Take 20 mg by mouth daily.  cyclobenzaprine (FLEXERIL) 10 mg tablet Take 10 mg by mouth three (3) times daily as needed for Muscle Spasm(s).        Review of symptoms:  General: no recent weight loss/gain, weakness, fatigue, fever or chills   Skin: no rashes, lumps, or other skin changes   HEENT: no headache, dizziness, lightheadedness, vision changes, hearing changes, tinnitus, vertigo, sinus pressure/pain, bleeding gums, sore throat, or hoarseness   Neck: no swollen glands, goiter, pain or stiffness   Respiratory: no cough, sputum, hemoptysis, dyspnea, wheezing   Cardiovascular: +chest pain or discomfort, no palpitations, no dyspnea, orthopnea, paroxysmal nocturnal dyspnea or peripheral edema   Gastrointestinal: no trouble swallowing, heartburn, change of appetite, nausea, change in bowel habits, pain with defecation, rectal bleeding or black/tarry stools, hemorrhoids, constipation, diarrhea, abdominal pain, jaundice, liver or gallbladder problems   Urinary: no frequency, urgency , hematuria, burning/pain with urination, recent flank pain, polyuria, nocturia, or difficulty urinating   Genital: no vaginal or pelvic infections   Peripheral Vascular: no claudication, leg cramps, prior DVTs, swelling of calves, legs, or feet, color change, or swelling with redness or tenderness   Musculoskeletal: no muscle or joint pain/stiffness, joint swelling, erythema of joints, or back pain   Psychiatric: no depression, mental disorders, or excessive stress   Neurological: no history of CVA, dizziness, no sensory or motor loss, seizures, syncope, tremors, numbness, tingling, no changes in mood, attention, or speech, no changes in orientation, memory, insight, or judgment. no headache, vertigo. Hematologic: no anemia, easy bruising or bleeding   Endocrine: +diabetes, thyroid problems, heat or cold intolerance, excessive sweating, polyuria, polydipsia      Subjective:   Physical Exam    Visit Vitals    /65    Pulse 78    Temp 98.3 °F (36.8 °C)    Resp 16    Ht 5' 6\" (1.676 m)    Wt 99.8 kg (220 lb)    SpO2 97%    BMI 35.51 kg/m2     General Appearance:  Well developed, overweight, alert and oriented x 3, and individual in no acute distress. Ears/Nose/Mouth/Throat:   Hearing grossly normal.         Neck: Supple, no JVD   Chest:   Lungs clear to auscultation bilaterally, no wheezing. Cardiovascular:  Regular rate and rhythm, S1, S2 normal, no murmur. Abdomen:   Soft, non-tender, bowel sounds are active. Extremities: No edema bilaterally. Skin: Warm and dry. Cardiographics  Telemetry: normal sinus rhythm  ECG: normal sinus rhythm    Labs:   Recent Results (from the past 24 hour(s))   EKG, 12 LEAD, INITIAL    Collection Time: 01/18/17  9:41 AM   Result Value Ref Range    Systolic BP  mmHg    Diastolic BP  mmHg    Ventricular Rate 69 BPM    Atrial Rate 69 BPM    P-R Interval 158 ms    QRS Duration 86 ms    Q-T Interval 392 ms    QTC Calculation (Bezet) 420 ms    Calculated P Axis 9 degrees    Calculated R Axis -9 degrees    Calculated T Axis 19 degrees    Diagnosis Normal sinus rhythm  Normal ECG      CBC WITH AUTOMATED DIFF    Collection Time: 01/18/17  9:45 AM   Result Value Ref Range    WBC 13.5 (H) 4.3 - 11.1 K/uL    RBC 5.21 4.05 - 5.25 M/uL    HGB 14.3 11.7 - 15.4 g/dL    HCT 44.0 35.8 - 46.3 %    MCV 84.5 79.6 - 97.8 FL    MCH 27.4 26.1 - 32.9 PG    MCHC 32.5 31.4 - 35.0 g/dL    RDW 14.1 11.9 - 14.6 %    PLATELET 624 (H) 304 - 450 K/uL    MPV 10.2 (L) 10.8 - 14.1 FL    NEUTROPHILS 46 43 - 78 %    LYMPHOCYTES 44 13 - 44 %    MONOCYTES 7 4.0 - 12.0 %    EOSINOPHILS 3 0.5 - 7.8 %    BASOPHILS 0 0.0 - 2.0 %    IMMATURE GRANULOCYTES 0 0.0 - 5.0 %    ABS. NEUTROPHILS 6.3 1.7 - 8.2 K/UL    ABS. LYMPHOCYTES 5.9 (H) 0.5 - 4.6 K/UL    ABS. MONOCYTES 0.9 0.1 - 1.3 K/UL    ABS. EOSINOPHILS 0.4 0.0 - 0.8 K/UL    ABS. BASOPHILS 0.0 0.0 - 0.2 K/UL    ABS. IMM.  GRANS. 0.0 0.0 - 0.5 K/UL    RBC COMMENTS NORMOCYTIC/NORMOCHROMIC      WBC COMMENTS ATYPICAL LYMPHOCYTES PRESENT      PLATELET COMMENTS SLIGHT      DF AUTOMATED     METABOLIC PANEL, COMPREHENSIVE    Collection Time: 01/18/17  9:45 AM   Result Value Ref Range    Sodium 138 136 - 145 mmol/L    Potassium 4.9 3.5 - 5.1 mmol/L    Chloride 103 98 - 107 mmol/L    CO2 27 21 - 32 mmol/L    Anion gap 8 7 - 16 mmol/L    Glucose 110 (H) 65 - 100 mg/dL    BUN 10 6 - 23 MG/DL    Creatinine 0.92 0.6 - 1.0 MG/DL    GFR est AA >60 >60 ml/min/1.73m2    GFR est non-AA >60 >60 ml/min/1.73m2    Calcium 9.1 8.3 - 10.4 MG/DL Bilirubin, total 0.5 0.2 - 1.1 MG/DL    ALT 37 12 - 65 U/L    AST 33 15 - 37 U/L    Alk. phosphatase 131 50 - 136 U/L    Protein, total 8.0 6.3 - 8.2 g/dL    Albumin 3.6 3.5 - 5.0 g/dL    Globulin 4.4 (H) 2.3 - 3.5 g/dL    A-G Ratio 0.8 (L) 1.2 - 3.5     TROPONIN I    Collection Time: 01/18/17  9:45 AM   Result Value Ref Range    Troponin-I, Qt. <0.02 (L) 0.02 - 0.05 NG/ML   D DIMER    Collection Time: 01/18/17  9:45 AM   Result Value Ref Range    D DIMER 0.27 <0.55 ug/ml(FEU)   TSH 3RD GENERATION    Collection Time: 01/18/17  9:45 AM   Result Value Ref Range    TSH 1.020 0.358 - 3.740 uIU/mL   URINE MICROSCOPIC    Collection Time: 01/18/17 11:55 AM   Result Value Ref Range    WBC 5-10 0 /hpf    RBC 5-10 0 /hpf    Epithelial cells 5-10 0 /hpf    Bacteria 1+ (H) 0 /hpf    Casts 0-3 0 /lpf   TROPONIN I    Collection Time: 01/18/17 12:38 PM   Result Value Ref Range    Troponin-I, Qt. <0.02 (L) 0.02 - 0.05 NG/ML   MONONUCLEOSIS SCREEN    Collection Time: 01/18/17 12:38 PM   Result Value Ref Range    Mononucleosis screen NEGATIVE  NEG         Pt has been seen and examined by Dr. Marlo Quinonez and he agrees with the following assessment and plan:     Assessment/Plan:        Diagnosis    Chest pain- admit to telemetry, R/O MI with serial CE's, ASA, BB, statin, nitrates prn, add heparin if troponin becomes positive, plan [facility] to evaluate for ischemia in Pt with multiple risk factors to include: overweight, smoker, family h/o, HTN, chol and DM, start IVF    HTN (hypertension)- continue BB, hold HCTZ, monitor    Dyslipidemia- check fasting lipid profile, add statin if needed    Diabetes mellitus type 2, diet-controlled ([address]- check Hgb A1c    Tobacco abuse- cessation encouraged    H/o Atrial tachycardia s/p SVT ablation 2014       Alyssa Nava PA-C     [facility] CARDIOLOGY     1/18/2017     6:25 PM    I have personally seen and examined Denzel Olson with  Carlton BARRY.   I agree and confirm findings in history, physical exam, and assessment/plan as outlined above with following pertinent additions/exceptions:   Patient with strong family history of CAD and prolonged smoking history. Presents with chest pain concerning for unstable angina. PE: CV: RRR  L: CTA bilaterally  E:No edema. ASS/Plan:  As above. [facility] today.        Sue Cool MD

## 2017-01-18 NOTE — ROUTINE PROCESS
Cath Lab Transfer In    Transferred from [facility]  Transferred to : Cath Lab Procedure Room 3  Reason for Transfer: [address] PCI    Attending physician: Emir      Patient Assessment    Patient received into procedure room. No acute distress noted or verbalized. Procedural prep completed. Iv accesses assessed for patency. Review of pertinent labs and allergies completed. Noted presence of current History & Physical, updated within the previous 24 hours. Consent signed.

## 2017-01-18 NOTE — PROGRESS NOTES
TRANSFER - IN REPORT:    Verbal report received from LEIGH ANN Weiner(name) on Janay Hoskins  being received from Cath lab(unit) for routine progression of care      Report consisted of patients Situation, Background, Assessment and   Recommendations(SBAR). Information from the following report(s) SBAR, Kardex and ED Summary was reviewed with the receiving nurse. Opportunity for questions and clarification was provided. Assessment completed upon patients arrival to unit and care assumed. Dual nurse skin assessment performed. No areas of breakdown noted.

## 2017-01-19 VITALS
HEART RATE: 62 BPM | SYSTOLIC BLOOD PRESSURE: 118 MMHG | OXYGEN SATURATION: 97 % | RESPIRATION RATE: 16 BRPM | WEIGHT: 239 LBS | BODY MASS INDEX: 38.41 KG/M2 | TEMPERATURE: 97.8 F | DIASTOLIC BLOOD PRESSURE: 55 MMHG | HEIGHT: 66 IN

## 2017-01-19 PROBLEM — I47.1 SVT (SUPRAVENTRICULAR TACHYCARDIA) (HCC): Status: ACTIVE | Noted: 2017-01-19

## 2017-01-19 LAB
ANION GAP BLD CALC-SCNC: 10 MMOL/L (ref 7–16)
BUN SERPL-MCNC: 10 MG/DL (ref 6–23)
CALCIUM SERPL-MCNC: 9.2 MG/DL (ref 8.3–10.4)
CHLORIDE SERPL-SCNC: 106 MMOL/L (ref 98–107)
CHOLEST SERPL-MCNC: 171 MG/DL
CO2 SERPL-SCNC: 27 MMOL/L (ref 21–32)
CREAT SERPL-MCNC: 0.83 MG/DL (ref 0.6–1)
ERYTHROCYTE [DISTWIDTH] IN BLOOD BY AUTOMATED COUNT: 14.1 % (ref 11.9–14.6)
GLUCOSE SERPL-MCNC: 100 MG/DL (ref 65–100)
HCT VFR BLD AUTO: 41.3 % (ref 35.8–46.3)
HDLC SERPL-MCNC: 34 MG/DL (ref 40–60)
HDLC SERPL: 5 {RATIO}
HGB BLD-MCNC: 13.2 G/DL (ref 11.7–15.4)
LDLC SERPL CALC-MCNC: 95.8 MG/DL
LIPID PROFILE,FLP: ABNORMAL
MCH RBC QN AUTO: 27.2 PG (ref 26.1–32.9)
MCHC RBC AUTO-ENTMCNC: 32 G/DL (ref 31.4–35)
MCV RBC AUTO: 85 FL (ref 79.6–97.8)
PLATELET # BLD AUTO: 462 K/UL (ref 150–450)
PMV BLD AUTO: 10.4 FL (ref 10.8–14.1)
POTASSIUM SERPL-SCNC: 3.7 MMOL/L (ref 3.5–5.1)
RBC # BLD AUTO: 4.86 M/UL (ref 4.05–5.25)
SODIUM SERPL-SCNC: 143 MMOL/L (ref 136–145)
TRIGL SERPL-MCNC: 206 MG/DL (ref 35–150)
VLDLC SERPL CALC-MCNC: 41.2 MG/DL (ref 6–23)
WBC # BLD AUTO: 12.6 K/UL (ref 4.3–11.1)

## 2017-01-19 PROCEDURE — 99218 HC RM OBSERVATION: CPT

## 2017-01-19 PROCEDURE — 80061 LIPID PANEL: CPT | Performed by: PHYSICIAN ASSISTANT

## 2017-01-19 PROCEDURE — 80048 BASIC METABOLIC PNL TOTAL CA: CPT | Performed by: PHYSICIAN ASSISTANT

## 2017-01-19 PROCEDURE — 85027 COMPLETE CBC AUTOMATED: CPT | Performed by: PHYSICIAN ASSISTANT

## 2017-01-19 PROCEDURE — 36415 COLL VENOUS BLD VENIPUNCTURE: CPT | Performed by: PHYSICIAN ASSISTANT

## 2017-01-19 PROCEDURE — 74011250637 HC RX REV CODE- 250/637: Performed by: INTERNAL MEDICINE

## 2017-01-19 RX ADMIN — OXYCODONE HYDROCHLORIDE 15 MG: 5 TABLET ORAL at 03:50

## 2017-01-19 RX ADMIN — ASPIRIN 81 MG CHEWABLE TABLET 81 MG: 81 TABLET CHEWABLE at 08:02

## 2017-01-19 RX ADMIN — Medication 10 ML: at 03:51

## 2017-01-19 RX ADMIN — HYDROCODONE BITARTRATE AND ACETAMINOPHEN 1 TABLET: 5; 325 TABLET ORAL at 08:02

## 2017-01-19 RX ADMIN — METOPROLOL SUCCINATE 50 MG: 50 TABLET, EXTENDED RELEASE ORAL at 08:02

## 2017-01-19 RX ADMIN — PANTOPRAZOLE SODIUM 40 MG: 40 TABLET, DELAYED RELEASE ORAL at 03:52

## 2017-01-19 RX ADMIN — CITALOPRAM HYDROBROMIDE 10 MG: 10 TABLET ORAL at 08:02

## 2017-01-19 NOTE — PROGRESS NOTES
TR band removed from right wrist. Sterile 4x4 gauze and tegaderm placed over catheter insertion site. Radial pulses +2. VSS. Pt. Denies pain. Pt. Instructed not to submerge dressing in water and to limit movement in RUE. No bleeding noted to area. Pt. Instructed to call out for any changes. Call light in reach. Will monitor.

## 2017-01-19 NOTE — DISCHARGE SUMMARY
[facility] Cardiology Discharge Summary     Patient ID:  Jignesh Ferris  [id number]  60 y.o.  1970    Admit date: 1/18/2017    Discharge date:  1/19/2017    Admitting Physician: Nalini Cruz MD     Discharge Physician: Franck Barraza PA/Dr. Tomasa Horta    Admission Diagnoses: Chest pain    Discharge Diagnoses:   Patient Active Problem List    Diagnosis Date Noted    Non cardiac chest pain  01/18/2017    HTN (hypertension) 01/18/2017    Dyslipidemia 01/18/2017    Diabetes mellitus type 2, diet-controlled ([facility] [address] 01/18/2017    Tobacco abuse 01/18/2017    Atrial tachycardia ([facility] [address] 11/21/2014       Cardiology Procedures this admission:  Diagnostic left heart catheterization  Consults: None    Hospital Course: Patient presented to the emergency department of [facility] with complaints of CP w h/o tobacco use and strong f/h of CAD. CP was severe and associated with diaphoresis and SOB. Patient was evaluated and subsequently admitted for further cardiac evaluation and treatment. Cardiac enzymes were negative. [facility] was planned for 1-18-17. Patient underwent cardiac catheterization by Dr. Carmen Mendoza which showed normal coronaries w nml EF. The morning of 1/19/2016 patient was up feeling well without any complaints of chest pain or shortness of breath. Patient's right radial cath site was clean, dry and intact without hematoma or bruit. Patient's labs were WNL. Patient was seen and examined by Dr. Tomasa Horta and determined stable and ready for discharge. The patient will follow up with [facility] Cardiology Dr. Richardson Going Feb 6 at 9:00[facility] CANCER [place] office. DISPOSITION: The patient is being discharged home in stable condition on a low saturated fat, low cholesterol and low salt diet. The patient is instructed to advance activities as tolerated to the limit of fatigue or shortness of breath. The patient is instructed to avoid all heavy lifting for 5 days.  The patient is instructed to watch the cath site for bleeding/oozing; if seen, the patient is instructed to apply firm pressure with a clean cloth and call [facility] Cardiology at [phone]. The patient is instructed to watch for signs of infection which include: increasing area of redness, fever/hot to touch or purulent drainage at the catheterization site. The patient is instructed not to soak in a bathtub for 7-10 days, but is cleared to shower. The patient is instructed to call the office or return to the ER for immediate evaluation for any shortness of breath or chest pain not relieved by NTG. Discharge Exam:   Visit Vitals    /55 (BP 1 Location: Left arm, BP Patient Position: Sitting)    Pulse 62    Temp 97.8 °F (36.6 °C)    Resp 16    Ht 5' 6\" (1.676 m)    Wt 108.4 kg (239 lb)    SpO2 97%    BMI 38.58 kg/m2     Patient has been seen by Dr. Trudy Koroma.     Recent Results (from the past 24 hour(s))   EKG, 12 LEAD, INITIAL    Collection Time: 01/18/17  9:41 AM   Result Value Ref Range    Systolic BP  mmHg    Diastolic BP  mmHg    Ventricular Rate 69 BPM    Atrial Rate 69 BPM    P-R Interval 158 ms    QRS Duration 86 ms    Q-T Interval 392 ms    QTC Calculation (Bezet) 420 ms    Calculated P Axis 9 degrees    Calculated R Axis -9 degrees    Calculated T Axis 19 degrees    Diagnosis       Normal sinus rhythm  Normal ECG  Confirmed by BRIT GALEANA (), TAMMY JEFFERSON ([id number]) on 1/18/2017 6:32:37 PM     CBC WITH AUTOMATED DIFF    Collection Time: 01/18/17  9:45 AM   Result Value Ref Range    WBC 13.5 (H) 4.3 - 11.1 K/uL    RBC 5.21 4.05 - 5.25 M/uL    HGB 14.3 11.7 - 15.4 g/dL    HCT 44.0 35.8 - 46.3 %    MCV 84.5 79.6 - 97.8 FL    MCH 27.4 26.1 - 32.9 PG    MCHC 32.5 31.4 - 35.0 g/dL    RDW 14.1 11.9 - 14.6 %    PLATELET 133 (H) 964 - 450 K/uL    MPV 10.2 (L) 10.8 - 14.1 FL    NEUTROPHILS 46 43 - 78 %    LYMPHOCYTES 44 13 - 44 %    MONOCYTES 7 4.0 - 12.0 %    EOSINOPHILS 3 0.5 - 7.8 %    BASOPHILS 0 0.0 - 2.0 %    IMMATURE GRANULOCYTES 0 0.0 - 5.0 %    ABS. NEUTROPHILS 6.3 1.7 - 8.2 K/UL    ABS. LYMPHOCYTES 5.9 (H) 0.5 - 4.6 K/UL    ABS. MONOCYTES 0.9 0.1 - 1.3 K/UL    ABS. EOSINOPHILS 0.4 0.0 - 0.8 K/UL    ABS. BASOPHILS 0.0 0.0 - 0.2 K/UL    ABS. IMM. GRANS. 0.0 0.0 - 0.5 K/UL    RBC COMMENTS NORMOCYTIC/NORMOCHROMIC      WBC COMMENTS ATYPICAL LYMPHOCYTES PRESENT      PLATELET COMMENTS SLIGHT      DF AUTOMATED     METABOLIC PANEL, COMPREHENSIVE    Collection Time: 01/18/17  9:45 AM   Result Value Ref Range    Sodium 138 136 - 145 mmol/L    Potassium 4.9 3.5 - 5.1 mmol/L    Chloride 103 98 - 107 mmol/L    CO2 27 21 - 32 mmol/L    Anion gap 8 7 - 16 mmol/L    Glucose 110 (H) 65 - 100 mg/dL    BUN 10 6 - 23 MG/DL    Creatinine 0.92 0.6 - 1.0 MG/DL    GFR est AA >60 >60 ml/min/1.73m2    GFR est non-AA >60 >60 ml/min/1.73m2    Calcium 9.1 8.3 - 10.4 MG/DL    Bilirubin, total 0.5 0.2 - 1.1 MG/DL    ALT 37 12 - 65 U/L    AST 33 15 - 37 U/L    Alk.  phosphatase 131 50 - 136 U/L    Protein, total 8.0 6.3 - 8.2 g/dL    Albumin 3.6 3.5 - 5.0 g/dL    Globulin 4.4 (H) 2.3 - 3.5 g/dL    A-G Ratio 0.8 (L) 1.2 - 3.5     TROPONIN I    Collection Time: 01/18/17  9:45 AM   Result Value Ref Range    Troponin-I, Qt. <0.02 (L) 0.02 - 0.05 NG/ML   D DIMER    Collection Time: 01/18/17  9:45 AM   Result Value Ref Range    D DIMER 0.27 <0.55 ug/ml(FEU)   TSH 3RD GENERATION    Collection Time: 01/18/17  9:45 AM   Result Value Ref Range    TSH 1.020 0.358 - 3.740 uIU/mL   HEMOGLOBIN A1C WITH EAG    Collection Time: 01/18/17  9:45 AM   Result Value Ref Range    Hemoglobin A1c 6.4 (H) 4.8 - 6.0 %    Est. average glucose 137 mg/dL   URINE MICROSCOPIC    Collection Time: 01/18/17 11:55 AM   Result Value Ref Range    WBC 5-10 0 /hpf    RBC 5-10 0 /hpf    Epithelial cells 5-10 0 /hpf    Bacteria 1+ (H) 0 /hpf    Casts 0-3 0 /lpf   TROPONIN I    Collection Time: 01/18/17 12:38 PM   Result Value Ref Range    Troponin-I, Qt. <0.02 (L) 0.02 - 0.05 NG/ML   MONONUCLEOSIS SCREEN    Collection Time: 01/18/17 12:38 PM   Result Value Ref Range    Mononucleosis screen NEGATIVE  NEG     CK    Collection Time: 01/18/17  3:52 PM   Result Value Ref Range    CK 89 21 - 215 U/L   TROPONIN I    Collection Time: 01/18/17  3:52 PM   Result Value Ref Range    Troponin-I, Qt. <0.02 (L) 0.02 - 2.28 NG/ML   METABOLIC PANEL, BASIC    Collection Time: 01/19/17  3:40 AM   Result Value Ref Range    Sodium 143 136 - 145 mmol/L    Potassium 3.7 3.5 - 5.1 mmol/L    Chloride 106 98 - 107 mmol/L    CO2 27 21 - 32 mmol/L    Anion gap 10 7 - 16 mmol/L    Glucose 100 65 - 100 mg/dL    BUN 10 6 - 23 MG/DL    Creatinine 0.83 0.6 - 1.0 MG/DL    GFR est AA >60 >60 ml/min/1.73m2    GFR est non-AA >60 >60 ml/min/1.73m2    Calcium 9.2 8.3 - 10.4 MG/DL   LIPID PANEL    Collection Time: 01/19/17  3:40 AM   Result Value Ref Range    LIPID PROFILE          Cholesterol, total 171 <200 MG/DL    Triglyceride 206 (H) 35 - 150 MG/DL    HDL Cholesterol 34 (L) 40 - 60 MG/DL    LDL, calculated 95.8 <100 MG/DL    VLDL, calculated 41.2 (H) 6.0 - 23.0 MG/DL    CHOL/HDL Ratio 5.0     CBC W/O DIFF    Collection Time: 01/19/17  3:40 AM   Result Value Ref Range    WBC 12.6 (H) 4.3 - 11.1 K/uL    RBC 4.86 4.05 - 5.25 M/uL    HGB 13.2 11.7 - 15.4 g/dL    HCT 41.3 35.8 - 46.3 %    MCV 85.0 79.6 - 97.8 FL    MCH 27.2 26.1 - 32.9 PG    MCHC 32.0 31.4 - 35.0 g/dL    RDW 14.1 11.9 - 14.6 %    PLATELET 136 (H) 855 - 450 K/uL    MPV 10.4 (L) 10.8 - 14.1 FL         Patient Instructions:   Current Discharge Medication List      CONTINUE these medications which have NOT CHANGED    Details   diclofenac EC (VOLTAREN) 75 mg EC tablet Take 1 Tab by mouth two (2) times a day. Qty: 20 Tab, Refills: 0      oxyCODONE IR (OXY-IR) 15 mg immediate release tablet Take 15 mg by mouth four (4) times daily as needed for Pain.      hydrochlorothiazide (HYDRODIURIL) 25 mg tablet Take 25 mg by mouth daily. citalopram (CELEXA) 10 mg tablet Take 10 mg by mouth daily.       gabapentin (NEURONTIN) 300 mg capsule Take 300 mg by mouth nightly as needed. metoprolol-XL (TOPROL XL) 100 mg XL tablet Take 50 mg by mouth two (2) times a day. esomeprazole (NEXIUM) 20 mg capsule Take 20 mg by mouth daily. cyclobenzaprine (FLEXERIL) 10 mg tablet Take 10 mg by mouth three (3) times daily as needed for Muscle Spasm(s). Signed:  Rosalie Rodriguez PA-C  1/19/2017  8:42 AM    Patient seen and examined by me. Agree with above note by physician extender.   Key findings are:  CP with non-cardiac etiology  CV- RRR without MRG  Lungs- Clear bilaterally  Abdomen- soft, non-tender, normal bowel sounds  Extremities- no edema    Plan:  DC home      Yves Duran MD

## 2017-01-19 NOTE — DISCHARGE INSTRUCTIONS
DISCHARGE SUMMARY from Nurse    The following personal items are in your possession at time of discharge:    Dental Appliances: None        Home Medications: None  Jewelry: With patient  Clothing: With patient  Other Valuables: None             PATIENT INSTRUCTIONS:    After general anesthesia or intravenous sedation, for 24 hours or while taking prescription Narcotics:  · Limit your activities  · Do not drive and operate hazardous machinery  · Do not make important personal or business decisions  · Do  not drink alcoholic beverages  · If you have not urinated within 8 hours after discharge, please contact your surgeon on call. Report the following to your surgeon:  · Excessive pain, swelling, redness or odor of or around the surgical area  · Temperature over 100.5  · Nausea and vomiting lasting longer than 4 hours or if unable to take medications  · Any signs of decreased circulation or nerve impairment to extremity: change in color, persistent  numbness, tingling, coldness or increase pain  · Any questions        What to do at Home:  Recommended activity: No lifting with right arm    *  Please give a list of your current medications to your Primary Care Provider. *  Please update this list whenever your medications are discontinued, doses are      changed, or new medications (including over-the-counter products) are added. *  Please carry medication information at all times in case of emergency situations. These are general instructions for a healthy lifestyle:    No smoking/ No tobacco products/ Avoid exposure to second hand smoke    Surgeon General's Warning:  Quitting smoking now greatly reduces serious risk to your health.     Obesity, smoking, and sedentary lifestyle greatly increases your risk for illness    A healthy diet, regular physical exercise & weight monitoring are important for maintaining a healthy lifestyle    You may be retaining fluid if you have a history of heart failure or if you experience any of the following symptoms:  Weight gain of 3 pounds or more overnight or 5 pounds in a week, increased swelling in our hands or feet or shortness of breath while lying flat in bed. Please call your doctor as soon as you notice any of these symptoms; do not wait until your next office visit. Recognize signs and symptoms of STROKE:    F-face looks uneven    A-arms unable to move or move unevenly    S-speech slurred or non-existent    T-time-call 911 as soon as signs and symptoms begin-DO NOT go       Back to bed or wait to see if you get better-TIME IS BRAIN. Warning Signs of HEART ATTACK     Call 911 if you have these symptoms:   Chest discomfort. Most heart attacks involve discomfort in the center of the chest that lasts more than a few minutes, or that goes away and comes back. It can feel like uncomfortable pressure, squeezing, fullness, or pain.  Discomfort in other areas of the upper body. Symptoms can include pain or discomfort in one or both arms, the back, neck, jaw, or stomach.  Shortness of breath with or without chest discomfort.  Other signs may include breaking out in a cold sweat, nausea, or lightheadedness. Don't wait more than five minutes to call 911 - MINUTES MATTER! Fast action can save your life. Calling 911 is almost always the fastest way to get lifesaving treatment. Emergency Medical Services staff can begin treatment when they arrive -- up to an hour sooner than if someone gets to the hospital by car. The discharge information has been reviewed with the patient. The patient verbalized understanding. Discharge medications reviewed with the patient and appropriate educational materials and side effects teaching were provided.

## 2017-01-19 NOTE — PROGRESS NOTES
Verbal & Bedside shift change report given to Sharon Murphy (oncoming nurse) by Ricardo Lerner (offgoing nurse). Report given with SBAR, Kardex, Intake/Output, MAR and Recent Results.

## 2017-01-19 NOTE — PROGRESS NOTES
Patient escorted to discharge area where her daughter drove her home. DISCHARGE SUMMARY from Nurse    The following personal items are in your possession at time of discharge:    Dental Appliances: None        Home Medications: None  Jewelry: With patient  Clothing: With patient  Other Valuables: None             PATIENT INSTRUCTIONS:    After general anesthesia or intravenous sedation, for 24 hours or while taking prescription Narcotics:  · Limit your activities  · Do not drive and operate hazardous machinery  · Do not make important personal or business decisions  · Do  not drink alcoholic beverages  · If you have not urinated within 8 hours after discharge, please contact your surgeon on call. Report the following to your surgeon:  · Excessive pain, swelling, redness or odor of or around the surgical area  · Temperature over 100.5  · Nausea and vomiting lasting longer than 4 hours or if unable to take medications  · Any signs of decreased circulation or nerve impairment to extremity: change in color, persistent  numbness, tingling, coldness or increase pain  · Any questions        What to do at Home:  Recommended activity: See surgical instructions,       *  Please give a list of your current medications to your Primary Care Provider. *  Please update this list whenever your medications are discontinued, doses are      changed, or new medications (including over-the-counter products) are added. *  Please carry medication information at all times in case of emergency situations. These are general instructions for a healthy lifestyle:    No smoking/ No tobacco products/ Avoid exposure to second hand smoke    Surgeon General's Warning:  Quitting smoking now greatly reduces serious risk to your health.     Obesity, smoking, and sedentary lifestyle greatly increases your risk for illness    A healthy diet, regular physical exercise & weight monitoring are important for maintaining a healthy lifestyle    You may be retaining fluid if you have a history of heart failure or if you experience any of the following symptoms:  Weight gain of 3 pounds or more overnight or 5 pounds in a week, increased swelling in our hands or feet or shortness of breath while lying flat in bed. Please call your doctor as soon as you notice any of these symptoms; do not wait until your next office visit. Recognize signs and symptoms of STROKE:    F-face looks uneven    A-arms unable to move or move unevenly    S-speech slurred or non-existent    T-time-call 911 as soon as signs and symptoms begin-DO NOT go       Back to bed or wait to see if you get better-TIME IS BRAIN. Warning Signs of HEART ATTACK     Call 911 if you have these symptoms:   Chest discomfort. Most heart attacks involve discomfort in the center of the chest that lasts more than a few minutes, or that goes away and comes back. It can feel like uncomfortable pressure, squeezing, fullness, or pain.  Discomfort in other areas of the upper body. Symptoms can include pain or discomfort in one or both arms, the back, neck, jaw, or stomach.  Shortness of breath with or without chest discomfort.  Other signs may include breaking out in a cold sweat, nausea, or lightheadedness. Don't wait more than five minutes to call [address]! Fast action can save your life. Calling 911 is almost always the fastest way to get lifesaving treatment. Emergency Medical Services staff can begin treatment when they arrive  up to an hour sooner than if someone gets to the hospital by car. The discharge information has been reviewed with the patient and spouse. The patient and spouse verbalized understanding. Discharge medications reviewed with the patient and spouse and appropriate educational materials and side effects teaching were provided.

## 2017-01-19 NOTE — PROCEDURES
[facility] [address] 45       Name:  Krysten Andrade   MR#:  [id number]   :  1970   Account #:  [de-identified]   Date of Adm:  2017       DATE OF STUDY: 2017     STUDY: Left heart catheterization, selective coronary   arteriography, left ventriculogram via the right radial   approach. INDICATION: Chest pain concerning for unstable angina in a   patient with multiple cardiac risk factors and a strong family   history of premature coronary artery disease. TECHNICAL FACTORS: After informed consent was obtained, the   patient was brought to the cardiac catheterization lab. The   right radial artery was prepped and draped in the usual sterile   fashion. Utilizing modified Seldinger technique and   micropuncture needle, the right radial artery was entered. A 6-  Pashto Terumo Slender sheath was placed without difficulty. A   radial cocktail consisting of 2000 units of heparin, 2 mg of   verapamil, 200 mcg of nitroglycerin was administered. A 5-Pashto   Tiger 4.0 catheter was used to selectively engage the ostium of   the left main coronary artery and right coronary artery   respectively. Selective injections in various projections were   performed. A pigtail catheter was used to cross the aortic valve   and enter the left ventricle. Hemodynamic measurements and left   ventriculogram were obtained. Left ventricular aortic pressure   gradient was obtained by pullback technique. At the conclusion of the diagnostic procedure, the sheath was   removed and pneumatic band was placed with excellent hemostasis. No complications were encountered. CONTRAST: Isovue, 80 mL. HEMODYNAMIC RESULTS    1. Aortic pressure was 113/57 with a mean of 78.    2. Left ventricular end-diastolic pressure was 6.    3. There was no significant gradient across the aortic valve. ANGIOGRAPHIC RESULTS     1. Left main coronary artery: Large-caliber vessel. Angiographically normal.    2. LAD: Medium- to large-caliber vessel. Angiographically normal.    3. Left circumflex: Medium-caliber, codominant vessel. Angiographically normal.    4. First obtuse marginal artery: Medium-caliber vessel. Normal.    5. Second obtuse marginal artery: Small-caliber vessels. Normal.    6. Right coronary artery: Medium-caliber, codominant vessel. Angiographically normal.    7. Right PDA: Small-caliber vessel. Normal.    8. Left ventriculogram performed in the DAMON projection showed   normal left ventricular systolic function, EF 74% to 65%. No   focal segmental wall motion abnormalities. CONCLUSIONS    1. Normal coronary arteries. 2. Normal left ventricular systolic function. PLAN: Ongoing medical therapy.          Eduarda Simmons MD      MGN / AK   D:  01/18/2017   19:02   T:  01/18/2017   19:26   Job #:  [id number]

## 2017-05-03 ENCOUNTER — HOSPITAL ENCOUNTER (EMERGENCY)
Age: 47
Discharge: HOME | End: 2017-05-03
Attending: EMERGENCY MEDICINE
Payer: COMMERCIAL

## 2017-05-03 VITALS
SYSTOLIC BLOOD PRESSURE: 115 MMHG | HEIGHT: 66 IN | OXYGEN SATURATION: 98 % | WEIGHT: 230 LBS | TEMPERATURE: 98 F | RESPIRATION RATE: 18 BRPM | BODY MASS INDEX: 36.96 KG/M2 | HEART RATE: 80 BPM | DIASTOLIC BLOOD PRESSURE: 58 MMHG

## 2017-05-03 DIAGNOSIS — L50.9 URTICARIA: Primary | ICD-10-CM

## 2017-05-03 PROCEDURE — 74011250637 HC RX REV CODE- 250/637: Performed by: EMERGENCY MEDICINE

## 2017-05-03 PROCEDURE — 99283 EMERGENCY DEPT VISIT LOW MDM: CPT | Performed by: EMERGENCY MEDICINE

## 2017-05-03 PROCEDURE — 96375 TX/PRO/DX INJ NEW DRUG ADDON: CPT | Performed by: EMERGENCY MEDICINE

## 2017-05-03 PROCEDURE — 96361 HYDRATE IV INFUSION ADD-ON: CPT | Performed by: EMERGENCY MEDICINE

## 2017-05-03 PROCEDURE — 74011000250 HC RX REV CODE- 250: Performed by: EMERGENCY MEDICINE

## 2017-05-03 PROCEDURE — 96374 THER/PROPH/DIAG INJ IV PUSH: CPT | Performed by: EMERGENCY MEDICINE

## 2017-05-03 PROCEDURE — 74011250636 HC RX REV CODE- 250/636: Performed by: EMERGENCY MEDICINE

## 2017-05-03 RX ORDER — FAMOTIDINE 10 MG/ML
20 INJECTION INTRAVENOUS
Status: COMPLETED | OUTPATIENT
Start: 2017-05-03 | End: 2017-05-03

## 2017-05-03 RX ORDER — SODIUM CHLORIDE 0.9 % (FLUSH) 0.9 %
5-10 SYRINGE (ML) INJECTION EVERY 8 HOURS
Status: DISCONTINUED | OUTPATIENT
Start: 2017-05-03 | End: 2017-05-03 | Stop reason: HOSPADM

## 2017-05-03 RX ORDER — SODIUM CHLORIDE 0.9 % (FLUSH) 0.9 %
5-10 SYRINGE (ML) INJECTION AS NEEDED
Status: DISCONTINUED | OUTPATIENT
Start: 2017-05-03 | End: 2017-05-03 | Stop reason: HOSPADM

## 2017-05-03 RX ORDER — EPINEPHRINE 0.3 MG/.3ML
0.3 INJECTION SUBCUTANEOUS
Qty: 1 SYRINGE | Refills: 0 | Status: SHIPPED | OUTPATIENT
Start: 2017-05-03

## 2017-05-03 RX ORDER — PREDNISONE 20 MG/1
60 TABLET ORAL DAILY
Qty: 9 TAB | Refills: 0 | Status: SHIPPED | OUTPATIENT
Start: 2017-05-03 | End: 2017-05-06

## 2017-05-03 RX ORDER — HYDROXYZINE PAMOATE 25 MG/1
25 CAPSULE ORAL
Status: COMPLETED | OUTPATIENT
Start: 2017-05-03 | End: 2017-05-03

## 2017-05-03 RX ADMIN — SODIUM CHLORIDE 1000 ML: 900 INJECTION, SOLUTION INTRAVENOUS at 06:29

## 2017-05-03 RX ADMIN — METHYLPREDNISOLONE SODIUM SUCCINATE 125 MG: 125 INJECTION, POWDER, FOR SOLUTION INTRAMUSCULAR; INTRAVENOUS at 06:28

## 2017-05-03 RX ADMIN — FAMOTIDINE 20 MG: 10 INJECTION, SOLUTION INTRAVENOUS at 06:28

## 2017-05-03 RX ADMIN — HYDROXYZINE PAMOATE 25 MG: 25 CAPSULE ORAL at 06:28

## 2017-05-03 NOTE — ED TRIAGE NOTES
Patient with complaints of allergic reaction. States woke up about 5am today with severe itching, states took 2 benadryl at home. States feels SHOB, itchy all over, redness present.

## 2017-05-03 NOTE — ED PROVIDER NOTES
HPI Comments: 55 yo female woke up suddenly this morning at 5 AM with itching all over, lightheadedness, and shortness of breath. She denies any similar symptoms in the past.  She denies any new exposures to food, detergents, soaps, pets, medications. She took 2 Benadryl without improvement. She denies sensation of throat closing. She has some nausea without vomiting. Patient is a 56 y.o. female presenting with allergic reaction. The history is provided by the patient. Allergic Reaction    Associated symptoms include nausea and shortness of breath. Pertinent negatives include no vomiting and no confusion.         Past Medical History:   Diagnosis Date    CAD (coronary artery disease)     fast heart rate     Chronic pain     Back    Diabetes (HCC)     type 2, aver= 135- feels badlyat low -90--- high 200 - resolved with weight loss    Diabetes mellitus type 2, diet-controlled ([facility] [address] 1/18/2017    GERD (gastroesophageal reflux disease)     Hypertension     Ill-defined condition     DDD    Other ill-defined conditions     pain rt breast    Other ill-defined conditions     chronic back pain    Psychiatric disorder     Tachycardia     V tach ([address]        Past Surgical History:   Procedure Laterality Date    BREAST SURGERY PROCEDURE UNLISTED      excision right breast ducts    HX ORTHOPAEDIC      left hand  ganglion    HX TUBAL LIGATION           Family History:   Problem Relation Age of Onset    Diabetes Mother     Coronary Artery Disease Mother     Stroke Father     Breast Cancer Maternal Aunt 28    Coronary Artery Disease Brother     Pseudocholinesterase Deficiency Neg Hx     Malignant Hyperthermia Neg Hx     Delayed Awakening Neg Hx     Post-op Nausea/Vomiting Neg Hx     Emergence Delirium Neg Hx     Post-op Cognitive Dysfunction Neg Hx     Other Neg Hx        Social History     Social History    Marital status:      Spouse name: N/A    Number of children: N/A    Years of education: N/A     Occupational History    Not on file. Social History Main Topics    Smoking status: Current Every Day Smoker     Packs/day: 0.50     Years: 15.00    Smokeless tobacco: Never Used    Alcohol use No    Drug use: No    Sexual activity: Not on file     Other Topics Concern    Not on file     Social History Narrative         ALLERGIES: Naproxen and Ultram [tramadol]    Review of Systems   Constitutional: Negative for chills, diaphoresis and fever. HENT: Negative for hearing loss. Eyes: Negative for visual disturbance. Respiratory: Positive for shortness of breath. Negative for cough. Cardiovascular: Negative for chest pain and palpitations. Gastrointestinal: Positive for nausea. Negative for abdominal pain, diarrhea and vomiting. Musculoskeletal: Negative for back pain. Skin: Positive for rash. Neurological: Negative for weakness and headaches. Psychiatric/Behavioral: Negative for confusion. Vitals:    05/03/17 0614   BP: 109/56   Pulse: 89   Resp: 18   Temp: 97.5 °F (36.4 °C)   SpO2: 97%   Weight: 104.3 kg (230 lb)   Height: 5' 6\" (1.676 m)            Physical Exam   Constitutional: She appears well-developed and well-nourished. HENT:   Head: Normocephalic and atraumatic. Right Ear: External ear normal.   Left Ear: External ear normal.   Nose: Nose normal.   Mouth/Throat: Uvula is midline and oropharynx is clear and moist. No uvula swelling. Eyes: Conjunctivae are normal. Pupils are equal, round, and reactive to light. Neck: Normal range of motion. Neck supple. Cardiovascular: Regular rhythm, normal heart sounds and intact distal pulses. Pulmonary/Chest: Effort normal and breath sounds normal. No respiratory distress. She has no wheezes. Abdominal: Soft. Bowel sounds are normal. She exhibits no distension. There is no tenderness. Musculoskeletal: Normal range of motion. She exhibits no edema. Neurological: She is alert.    Skin: Skin is warm and dry. Rash noted. Rash is urticarial.        Psychiatric: Judgment normal.   Nursing note and vitals reviewed. MDM  Number of Diagnoses or Management Options  Diagnosis management comments: Parts of this document were created using dragon voice recognition software. The chart has been reviewed but errors may still be present. 6:23 AM  We'll medicate and monitor. 6:51 AM  Feeling better. Hives somewhat improved. We'll prescribe prednisone and EpiPen. I discussed the results of all labs, procedures, radiographs, and treatments with the patient and available family. Treatment plan is agreed upon and the patient is ready for discharge. Questions about treatment in the ED and differential diagnosis of presenting condition were answered. Patient was given verbal discharge instructions including, but not limited to, importance of returning to the emergency department for any concern of worsening or continued symptoms. Instructions were given to follow up with a primary care provider or specialist within 1-2 days. Adverse effects of medications, if prescribed, were discussed and patient was advised to refrain from significant physical activity until followed up by primary care physician and to not drive or operate heavy machinery after taking any sedating substances.            Amount and/or Complexity of Data Reviewed  Tests in the medicine section of CPT®: ordered and reviewed      ED Course       Procedures

## 2017-05-03 NOTE — DISCHARGE INSTRUCTIONS
Hives: Care Instructions  Your Care Instructions  Hives are raised, red, itchy patches of skin. They are also called wheals or welts. They usually have red borders and pale centers. Hives range in size from ¼ inch to 3 inches or more across. They may seem to move from place to place on the skin. Several hives may form a large area of raised, red skin. You can get hives after an insect sting, after taking medicine or eating certain foods, or because of infection or stress. Other causes include plants, things you breathe in, makeup, heat, cold, sunlight, and latex. You cannot spread hives to other people. Hives may last a few minutes or a few days, but a single spot may last less than 36 hours. Follow-up care is a key part of your treatment and safety. Be sure to make and go to all appointments, and call your doctor if you are having problems. It's also a good idea to know your test results and keep a list of the medicines you take. How can you care for yourself at home? · Avoid whatever you think may have caused your hives, such as a certain food or medicine. However, you may not know the cause. · Put a cool, wet towel on the area to relieve itching. · Take an over-the-counter antihistamine, such as diphenhydramine (Benadryl), cetirizine (Zyrtec), or loratadine (Claritin), to help stop the hives and calm the itching. Read and follow directions on the label. These medicines can make you feel sleepy. Do not drive while using them. · Stay away from strong soaps, detergents, and chemicals. These can make itching worse. When should you call for help? Call 911 anytime you think you may need emergency care. For example, call if:  · You have symptoms of a severe allergic reaction. These may include:  ¨ Sudden raised, red areas (hives) all over your body. ¨ Swelling of the throat, mouth, lips, or tongue. ¨ Trouble breathing. ¨ Passing out (losing consciousness).  Or you may feel very lightheaded or suddenly feel weak, confused, or restless. Call your doctor now or seek immediate medical care if:  · You have symptoms of an allergic reaction, such as:  ¨ A rash or hives (raised, red areas on the skin). ¨ Itching. ¨ Swelling. ¨ Belly pain, nausea, or vomiting. · You get hives after you start a new medicine. · Hives have not gone away after 24 hours. Watch closely for changes in your health, and be sure to contact your doctor if:  · You do not get better as expected. Where can you learn more? Go to http://anibal-felix.info/. Enter F632 in the search box to learn more about \"Hives: Care Instructions. \"  Current as of: May 27, 2016  Content Version: 11.2  © 7672-5964 [employer]. Care instructions adapted under license by [employer] (which disclaims liability or warranty for this information). If you have questions about a medical condition or this instruction, always ask your healthcare professional. [address] any warranty or liability for your use of this information.

## 2017-05-03 NOTE — ED NOTES
Report to [facility] LE[facility]G. Care transferred at this time. BSSR Completed.

## 2019-02-08 ENCOUNTER — APPOINTMENT (OUTPATIENT)
Dept: GENERAL RADIOLOGY | Age: 49
End: 2019-02-08
Attending: EMERGENCY MEDICINE
Payer: COMMERCIAL

## 2019-02-08 ENCOUNTER — HOSPITAL ENCOUNTER (EMERGENCY)
Age: 49
Discharge: HOME | End: 2019-02-08
Attending: EMERGENCY MEDICINE
Payer: COMMERCIAL

## 2019-02-08 VITALS
BODY MASS INDEX: 36.96 KG/M2 | OXYGEN SATURATION: 98 % | HEIGHT: 66 IN | WEIGHT: 230 LBS | DIASTOLIC BLOOD PRESSURE: 61 MMHG | SYSTOLIC BLOOD PRESSURE: 119 MMHG | HEART RATE: 62 BPM | TEMPERATURE: 98.6 F | RESPIRATION RATE: 18 BRPM

## 2019-02-08 DIAGNOSIS — R07.89 ATYPICAL CHEST PAIN: Primary | ICD-10-CM

## 2019-02-08 DIAGNOSIS — R09.1 PLEURISY: ICD-10-CM

## 2019-02-08 LAB
ALBUMIN SERPL-MCNC: 3.5 G/DL (ref 3.5–5)
ALBUMIN/GLOB SERPL: 0.9 {RATIO}
ALP SERPL-CCNC: 129 U/L (ref 50–136)
ALT SERPL-CCNC: 35 U/L (ref 12–65)
ANION GAP SERPL CALC-SCNC: 8 MMOL/L
AST SERPL-CCNC: 15 U/L (ref 15–37)
ATRIAL RATE: 78 BPM
BILIRUB SERPL-MCNC: 0.4 MG/DL (ref 0.2–1.1)
BUN SERPL-MCNC: 10 MG/DL (ref 6–23)
CALCIUM SERPL-MCNC: 9 MG/DL (ref 8.3–10.4)
CALCULATED P AXIS, ECG09: 11 DEGREES
CALCULATED R AXIS, ECG10: -28 DEGREES
CALCULATED T AXIS, ECG11: 12 DEGREES
CHLORIDE SERPL-SCNC: 106 MMOL/L (ref 98–107)
CO2 SERPL-SCNC: 25 MMOL/L (ref 21–32)
CREAT SERPL-MCNC: 0.99 MG/DL (ref 0.6–1)
DIAGNOSIS, 93000: NORMAL
ERYTHROCYTE [DISTWIDTH] IN BLOOD BY AUTOMATED COUNT: 13.3 % (ref 11.9–14.6)
GLOBULIN SER CALC-MCNC: 4.1 G/DL (ref 2.3–3.5)
GLUCOSE SERPL-MCNC: 129 MG/DL (ref 65–100)
HCT VFR BLD AUTO: 41.4 % (ref 35.8–46.3)
HGB BLD-MCNC: 13.3 G/DL (ref 11.7–15.4)
MCH RBC QN AUTO: 27.2 PG (ref 26.1–32.9)
MCHC RBC AUTO-ENTMCNC: 32.1 G/DL (ref 31.4–35)
MCV RBC AUTO: 84.7 FL (ref 79.6–97.8)
NRBC # BLD: 0 K/UL (ref 0–0.2)
P-R INTERVAL, ECG05: 168 MS
PLATELET # BLD AUTO: 527 K/UL (ref 150–450)
PMV BLD AUTO: 10.2 FL (ref 9.4–12.3)
POTASSIUM SERPL-SCNC: 3.8 MMOL/L (ref 3.5–5.1)
PROT SERPL-MCNC: 7.6 G/DL
Q-T INTERVAL, ECG07: 370 MS
QRS DURATION, ECG06: 84 MS
QTC CALCULATION (BEZET), ECG08: 421 MS
RBC # BLD AUTO: 4.89 M/UL (ref 4.05–5.2)
SODIUM SERPL-SCNC: 139 MMOL/L (ref 136–145)
TROPONIN I BLD-MCNC: 0 NG/ML (ref 0.02–0.05)
TROPONIN I SERPL-MCNC: <0.02 NG/ML (ref 0.02–0.05)
VENTRICULAR RATE, ECG03: 78 BPM
WBC # BLD AUTO: 11.7 K/UL (ref 4.3–11.1)

## 2019-02-08 PROCEDURE — 71046 X-RAY EXAM CHEST 2 VIEWS: CPT

## 2019-02-08 PROCEDURE — 93005 ELECTROCARDIOGRAM TRACING: CPT | Performed by: EMERGENCY MEDICINE

## 2019-02-08 PROCEDURE — 84484 ASSAY OF TROPONIN QUANT: CPT

## 2019-02-08 PROCEDURE — 99284 EMERGENCY DEPT VISIT MOD MDM: CPT | Performed by: EMERGENCY MEDICINE

## 2019-02-08 PROCEDURE — 85027 COMPLETE CBC AUTOMATED: CPT

## 2019-02-08 PROCEDURE — 80053 COMPREHEN METABOLIC PANEL: CPT

## 2019-02-08 RX ORDER — SODIUM CHLORIDE 0.9 % (FLUSH) 0.9 %
5-40 SYRINGE (ML) INJECTION AS NEEDED
Status: DISCONTINUED | OUTPATIENT
Start: 2019-02-08 | End: 2019-02-08 | Stop reason: HOSPADM

## 2019-02-08 RX ORDER — HYDROCHLOROTHIAZIDE 25 MG/1
25 TABLET ORAL DAILY
COMMUNITY
Start: 2015-07-16

## 2019-02-08 RX ORDER — ESOMEPRAZOLE MAGNESIUM 40 MG/1
40 CAPSULE, DELAYED RELEASE ORAL
COMMUNITY
Start: 2015-07-16 | End: 2021-09-14

## 2019-02-08 RX ORDER — GABAPENTIN 300 MG/1
300 CAPSULE ORAL
COMMUNITY
Start: 2019-01-22 | End: 2021-10-07

## 2019-02-08 RX ORDER — VENLAFAXINE HYDROCHLORIDE 150 MG/1
150 CAPSULE, EXTENDED RELEASE ORAL DAILY
COMMUNITY
Start: 2018-09-18

## 2019-02-08 RX ORDER — HYDROCODONE BITARTRATE AND ACETAMINOPHEN 7.5; 325 MG/1; MG/1
1 TABLET ORAL
COMMUNITY
Start: 2019-01-22 | End: 2021-09-14

## 2019-02-08 RX ORDER — CYCLOBENZAPRINE HCL 5 MG
5 TABLET ORAL
COMMUNITY
Start: 2018-12-14 | End: 2021-09-14

## 2019-02-08 RX ORDER — PREDNISONE 20 MG/1
40 TABLET ORAL DAILY
Qty: 8 TAB | Refills: 0 | Status: SHIPPED | OUTPATIENT
Start: 2019-02-08 | End: 2019-02-12

## 2019-02-08 RX ORDER — INSULIN PUMP SYRINGE, 3 ML
EACH MISCELLANEOUS
COMMUNITY
Start: 2017-04-27

## 2019-02-08 RX ORDER — ASPIRIN 325 MG
50000 TABLET, DELAYED RELEASE (ENTERIC COATED) ORAL
COMMUNITY
Start: 2017-05-01 | End: 2019-11-01

## 2019-02-08 RX ORDER — ROSUVASTATIN CALCIUM 10 MG/1
10 TABLET, COATED ORAL
COMMUNITY
Start: 2017-08-19 | End: 2019-09-18

## 2019-02-08 RX ORDER — METOPROLOL SUCCINATE 50 MG/1
50 TABLET, EXTENDED RELEASE ORAL DAILY
COMMUNITY
Start: 2015-07-16

## 2019-02-08 RX ORDER — SODIUM CHLORIDE 0.9 % (FLUSH) 0.9 %
5-40 SYRINGE (ML) INJECTION EVERY 8 HOURS
Status: DISCONTINUED | OUTPATIENT
Start: 2019-02-08 | End: 2019-02-08 | Stop reason: HOSPADM

## 2019-02-08 NOTE — LETTER
[facility] EMERGENCY DEPT 
[address] [place] [id number] [phone] Work/School Note Date: 2/8/2019 To Whom It May concern: 
 
Sima Andino was seen and treated today in the emergency room by the following provider(s): 
Attending Provider: Cindy Michelle MD.   
 
Sima Andino may return to work on 2/9/2019.  
 
Sincerely, 
 
 
 
 
Yovany Longoria RN

## 2019-02-08 NOTE — ED NOTES
I have reviewed discharge instructions with the patient and spouse. The patient and spouse verbalized understanding. Patient left ED via Discharge Method: ambulatory to Home with self. Opportunity for questions and clarification provided. Patient given 1 scripts. To continue your aftercare when you leave the hospital, you may receive an automated call from our care team to check in on how you are doing. This is a free service and part of our promise to provide the best care and service to meet your aftercare needs.  If you have questions, or wish to unsubscribe from this service please call [phone]. Thank you for Choosing our [facility] [employer] [facility] Emergency Department.

## 2019-02-08 NOTE — ED PROVIDER NOTES
Patient recently ill with upper respiratory infection and cough. Now with a left sided chest discomfort that has persisted for several days. It is worse with certain positions and with palpation. She denies pleuritic component at this time. No shortness of breath, nausea or sweating. No unilateral leg swelling. No hemoptysis. No recent surgery. No hormones. No previous cardiac history. HEART SCORE:  
 
History: Slightly or Non-suspicious ECG: Nonspecific repolarization disturbance Age: Greater than 39 years - Less than 72 years Risk Factors: 1 or 2 risk factors Troponin: Less than or equal to normal limit HEART Score Total : 3 Management Scores 0-3: 0.9-1.7% risk of adverse cardiac event. In the HEART Score study, these patients were discharged (0.99% in the retrospective study, 1.7% in the prospective study) Scores 4-6: 12-16.6% risk of adverse cardiac event. In the HEART Score study, these patients were admitted to the hospital. (11.6% retrospective, 16.6% prospective) Scores ? 7: 50-65% risk of adverse cardiac event. In the HEART Score study, these patients were candidates for early invasive measures. (65.2% retrospective, 50.1% prospective) A MACE (Major Adverse Cardiac Event) was defined as all-cause mortality, myocardial infarction, or coronary revascularization. Original/Primary Reference Angel KENNEDY, Oscar SILVEIRA, Kunal LAL. Chest pain in the emergency room: value of the HEART score. Neth Heart J. 2008;16(6):191-6. Validation Angel Juares, Kunal LAL, et al. A prospective validation of the HEART score for chest pain patients at the emergency department. Int J Cardiol. 2013;168(3):2153-8. Angel Juares, Mary Vela et al. Chest pain in the emergency room: a multicenter validation of the HEART Score. Crit Pathw Cardiol. 2010;9(3):164-9. Xavi CELESTINO, Charlie RF, Abhishek MILTON, et al. The HEART Pathway Randomized Controlled Trial One Year Outcomes.  [facility] 2018; 
 
 
 
 The history is provided by the patient. Chest Pain (Angina) This is a new problem. The current episode started more than 2 days ago. The problem has not changed since onset. The problem occurs constantly. The pain is associated with coughing and movement (certain positions, seems worse at night or when lying flat). The pain is present in the substernal region and left side. The pain is moderate. The quality of the pain is described as pressure-like. The pain does not radiate. The symptoms are aggravated by palpation and certain positions. Associated symptoms include back pain. Pertinent negatives include no abdominal pain, no cough, no diaphoresis, no fever, no headaches, no lower extremity edema, no nausea, no numbness, no shortness of breath, no vomiting and no weakness. She has tried nothing for the symptoms. Risk factors include smoking/tobacco exposure and family history. Her past medical history does not include DVT or PE. Pertinent negatives include no cardiac catheterization, no cardiac stents and no CABG. Past Medical History:  
Diagnosis Date  CAD (coronary artery disease)   
 fast heart rate  Chronic pain Back  Diabetes ([address] type 2, aver= 135- feels badlyat low -90--- high 200 - resolved with weight loss  Diabetes mellitus type 2, diet-controlled ([address] 1/18/2017  GERD (gastroesophageal reflux disease)  Hypertension  Ill-defined condition DDD  Other ill-defined conditions(799.89)   
 pain rt breast  
 Other ill-defined conditions(799.89) chronic back pain  Psychiatric disorder  Tachycardia  V tach ([address] Past Surgical History:  
Procedure Laterality Date  BREAST SURGERY PROCEDURE UNLISTED    
 excision right breast ducts  HX ORTHOPAEDIC    
 left hand  ganglion  HX TUBAL LIGATION Family History:  
Problem Relation Age of Onset  Diabetes Mother  Coronary Artery Disease Mother  Stroke Father  Breast Cancer Maternal Aunt 35  Coronary Artery Disease Brother  Pseudocholinesterase Deficiency Neg Hx  Malignant Hyperthermia Neg Hx  Delayed Awakening Neg Hx  Post-op Nausea/Vomiting Neg Hx  Emergence Delirium Neg Hx  Post-op Cognitive Dysfunction Neg Hx   
 Other Neg Hx Social History Socioeconomic History  Marital status:  Spouse name: Not on file  Number of children: Not on file  Years of education: Not on file  Highest education level: Not on file Social Needs  Financial resource strain: Not on file  Food insecurity - worry: Not on file  Food insecurity - inability: Not on file  Transportation needs - medical: Not on file  Transportation needs - non-medical: Not on file Occupational History  Not on file Tobacco Use  Smoking status: Current Every Day Smoker Packs/day: 0.50 Years: 15.00 Pack years: 7.50  Smokeless tobacco: Never Used Substance and Sexual Activity  Alcohol use: No  
 Drug use: No  
 Sexual activity: Not on file Other Topics Concern  Not on file Social History Narrative  Not on file ALLERGIES: Naproxen and Ultram [tramadol] Review of Systems Constitutional: Negative for chills, diaphoresis and fever. HENT: Positive for congestion. Negative for rhinorrhea and sore throat. Eyes: Negative for photophobia and redness. Respiratory: Negative for cough and shortness of breath. Cardiovascular: Positive for chest pain. Negative for leg swelling. Gastrointestinal: Negative for abdominal pain, diarrhea, nausea and vomiting. Endocrine: Negative for polydipsia and polyuria. Musculoskeletal: Positive for back pain. Negative for myalgias. Neurological: Negative for weakness, numbness and headaches. Vitals:  
 02/08/19 1246 BP: 112/66 Pulse: 80 Resp: 18 Temp: 98.6 °F (37 °C) SpO2: 98% Weight: 104.3 kg (230 lb) Height: 5' 6\" (1.676 m) Physical Exam  
Constitutional: She appears well-developed and well-nourished. No distress. HENT:  
Head: Normocephalic. Eyes: Pupils are equal, round, and reactive to light. Cardiovascular: Normal rate, regular rhythm and normal heart sounds. Pulses: 
     Carotid pulses are 2+ on the right side, and 2+ on the left side. Radial pulses are 2+ on the right side, and 2+ on the left side. Pulmonary/Chest: Effort normal and breath sounds normal.  
Abdominal: Soft. She exhibits no distension and no mass. There is no tenderness. There is no rebound and no guarding. Musculoskeletal: Normal range of motion. Lymphadenopathy:  
  She has no cervical adenopathy. Neurological: She is alert. Skin: Skin is warm and dry. Nursing note and vitals reviewed. MDM Number of Diagnoses or Management Options Diagnosis management comments: Nonspecific EKG. Normal troponin ×2. Percocet criteria negative for PE. Doubt aortic dissection given equal pulses bilaterally and normal mediastinum on chest x-ray. Due to smoking and family history patient will be provided follow-up with [facility] cardiology for consideration of outpatient stress testing. Likely chest wall pain or pleurisy status post recent URI with cough. Amount and/or Complexity of Data Reviewed Clinical lab tests: ordered and reviewed (Results for orders placed or performed during the hospital encounter of 02/08/19 
-TROPONIN I Result                      Value             Ref Range Troponin-I, Qt.             <0.02 (L)         0.02 - 0.05 * 
-CBC W/O DIFF Result                      Value             Ref Range WBC                         11.7 (H)          4.3 - 11.1 K* 
     RBC                         4.89              4.05 - 5.2 M* HGB                         13.3              11.7 - 15.4 * HCT                         41.4              35.8 - 46.3 % MCV                         84.7              79.6 - 97.8 * MCH                         27.2              26.1 - 32.9 * MCHC                        32.1              31.4 - 35.0 * RDW                         13.3              11.9 - 14.6 % PLATELET                    527 (H)           150 - 450 K/* MPV                         10.2              9.4 - 12.3 FL ABSOLUTE NRBC               0.00              0.0 - 0.2 K/* 
-METABOLIC PANEL, COMPREHENSIVE Result                      Value             Ref Range Sodium                      139               136 - 145 mm* Potassium                   3.8               3.5 - 5.1 mm* Chloride                    106               98 - 107 mmo* CO2                         25                21 - 32 mmol* Anion gap                   8                 mmol/L Glucose                     129 (H)           65 - 100 mg/* BUN                         10                6 - 23 MG/DL Creatinine                  0.99              0.6 - 1.0 MG* 
     GFR est AA                  >60               >60 ml/min/1* GFR est non-AA              >60               ml/min/1.73m2 Calcium                     9.0               8.3 - 10.4 M* Bilirubin, total            0.4               0.2 - 1.1 MG* ALT (SGPT)                  35                12 - 65 U/L   
     AST (SGOT)                  15                15 - 37 U/L Alk. phosphatase            129               50 - 136 U/L Protein, total              7.6               g/dL Albumin                     3.5               3.5 - 5.0 g/* Globulin                    4.1 (H)           2.3 - 3.5 g/* A-G Ratio                   0.9 -POC TROPONIN-I Result                      Value             Ref Range Troponin-I (POC)            0 (L)             0.02 - 0.05 * 
-EKG, 12 LEAD, INITIAL Result                      Value             Ref Range Ventricular Rate            78                BPM           
     Atrial Rate                 78                BPM           
     P-R Interval                168               ms            
     QRS Duration                84                ms Q-T Interval                370               ms            
     QTC Calculation (Bezet)     421               ms            
     Calculated P Axis           11                degrees Calculated R Axis           -28               degrees Calculated T Axis           12                degrees Diagnosis Normal sinus rhythm Minimal voltage criteria for LVH, may be normal variant Borderline ECG When compared with ECG of 18-JAN-2017 09:41, No significant change was found Confirmed by Radha Helm ([id number]) on 2/8/2019 1:05:19 PM 
  
) Tests in the radiology section of CPT®: ordered and reviewed (Xr Chest Pa Lat Result Date: 2/8/2019 Chest 2 view CLINICAL INDICATION: Several days of persisting moderate substernal chest pressure with history of SVT COMPARISON: 1/18/2017 TECHNIQUE: Upright PA and lateral views of the chest FINDINGS: Lung volumes are well inflated. Cardiomediastinal silhouette and hilar contours within normal limits. The lungs are unchanged demonstrating minimal linear atelectasis in the left base with no evidence of consolidation, CHF, pneumothorax, effusion, or mass. No acute osseous abnormalities are seen. IMPRESSION: No acute disease. ) Procedures

## 2019-02-08 NOTE — ED TRIAGE NOTES
Pt c/o chest pain for a couple of days. States it feels like a pressure. She does have hx of SVT but none since having an ablation. Pt does not appear in distress, pt denies n/v with chest pain.

## 2019-11-01 PROBLEM — E66.01 SEVERE OBESITY (HCC): Status: ACTIVE | Noted: 2019-11-01

## 2021-09-14 ENCOUNTER — HOSPITAL ENCOUNTER (OUTPATIENT)
Dept: SURGERY | Age: 51
Discharge: HOME | End: 2021-09-14
Payer: COMMERCIAL

## 2021-09-14 ENCOUNTER — HOSPITAL ENCOUNTER (OUTPATIENT)
Dept: REHABILITATION | Age: 51
Discharge: HOME | End: 2021-09-14
Payer: COMMERCIAL

## 2021-09-14 VITALS
WEIGHT: 242 LBS | TEMPERATURE: 98.1 F | HEART RATE: 66 BPM | HEIGHT: 66 IN | SYSTOLIC BLOOD PRESSURE: 142 MMHG | BODY MASS INDEX: 38.89 KG/M2 | DIASTOLIC BLOOD PRESSURE: 79 MMHG | OXYGEN SATURATION: 100 %

## 2021-09-14 LAB
ALBUMIN SERPL-MCNC: 3.3 G/DL (ref 3.5–5)
ANION GAP SERPL CALC-SCNC: 7 MMOL/L (ref 7–16)
APTT PPP: 31.4 SEC (ref 24.1–35.1)
ATRIAL RATE: 63 BPM
BACTERIA SPEC CULT: NORMAL
BASOPHILS # BLD: 0.1 K/UL (ref 0–0.2)
BASOPHILS NFR BLD: 1 % (ref 0–2)
BUN SERPL-MCNC: 10 MG/DL (ref 6–23)
CALCIUM SERPL-MCNC: 9.1 MG/DL (ref 8.3–10.4)
CALCULATED P AXIS, ECG09: 11 DEGREES
CALCULATED R AXIS, ECG10: -13 DEGREES
CALCULATED T AXIS, ECG11: 11 DEGREES
CHLORIDE SERPL-SCNC: 106 MMOL/L (ref 98–107)
CO2 SERPL-SCNC: 27 MMOL/L (ref 21–32)
CREAT SERPL-MCNC: 0.88 MG/DL (ref 0.6–1)
DIAGNOSIS, 93000: NORMAL
DIFFERENTIAL METHOD BLD: ABNORMAL
EOSINOPHIL # BLD: 0.4 K/UL (ref 0–0.8)
EOSINOPHIL NFR BLD: 4 % (ref 0.5–7.8)
ERYTHROCYTE [DISTWIDTH] IN BLOOD BY AUTOMATED COUNT: 13.9 % (ref 11.9–14.6)
EST. AVERAGE GLUCOSE BLD GHB EST-MCNC: 131 MG/DL
GLUCOSE SERPL-MCNC: 119 MG/DL (ref 65–100)
HBA1C MFR BLD: 6.2 % (ref 4.2–6.3)
HCT VFR BLD AUTO: 41.9 % (ref 35.8–46.3)
HGB BLD-MCNC: 13.1 G/DL (ref 11.7–15.4)
IMM GRANULOCYTES # BLD AUTO: 0 K/UL (ref 0–0.5)
IMM GRANULOCYTES NFR BLD AUTO: 0 % (ref 0–5)
INR PPP: 1
LYMPHOCYTES # BLD: 3.9 K/UL (ref 0.5–4.6)
LYMPHOCYTES NFR BLD: 38 % (ref 13–44)
MCH RBC QN AUTO: 26.9 PG (ref 26.1–32.9)
MCHC RBC AUTO-ENTMCNC: 31.3 G/DL (ref 31.4–35)
MCV RBC AUTO: 86 FL (ref 79.6–97.8)
MONOCYTES # BLD: 0.8 K/UL (ref 0.1–1.3)
MONOCYTES NFR BLD: 8 % (ref 4–12)
NEUTS SEG # BLD: 5 K/UL (ref 1.7–8.2)
NEUTS SEG NFR BLD: 49 % (ref 43–78)
NRBC # BLD: 0 K/UL (ref 0–0.2)
P-R INTERVAL, ECG05: 168 MS
PLATELET # BLD AUTO: 469 K/UL (ref 150–450)
PMV BLD AUTO: 10.6 FL (ref 9.4–12.3)
POTASSIUM SERPL-SCNC: 3.9 MMOL/L (ref 3.5–5.1)
PROTHROMBIN TIME: 13.9 SEC (ref 12.6–14.5)
Q-T INTERVAL, ECG07: 406 MS
QRS DURATION, ECG06: 88 MS
QTC CALCULATION (BEZET), ECG08: 415 MS
RBC # BLD AUTO: 4.87 M/UL (ref 4.05–5.2)
SERVICE CMNT-IMP: NORMAL
SODIUM SERPL-SCNC: 140 MMOL/L (ref 136–145)
VENTRICULAR RATE, ECG03: 63 BPM
WBC # BLD AUTO: 10.3 K/UL (ref 4.3–11.1)

## 2021-09-14 PROCEDURE — 85610 PROTHROMBIN TIME: CPT

## 2021-09-14 PROCEDURE — 36415 COLL VENOUS BLD VENIPUNCTURE: CPT

## 2021-09-14 PROCEDURE — 97161 PT EVAL LOW COMPLEX 20 MIN: CPT

## 2021-09-14 PROCEDURE — 80048 BASIC METABOLIC PNL TOTAL CA: CPT

## 2021-09-14 PROCEDURE — 77030027138 HC INCENT SPIROMETER -A

## 2021-09-14 PROCEDURE — 87641 MR-STAPH DNA AMP PROBE: CPT

## 2021-09-14 PROCEDURE — 77030012341 HC CHMB SPCR OPTC MDI VYRM -A

## 2021-09-14 PROCEDURE — 85025 COMPLETE CBC W/AUTO DIFF WBC: CPT

## 2021-09-14 PROCEDURE — 83036 HEMOGLOBIN GLYCOSYLATED A1C: CPT

## 2021-09-14 PROCEDURE — 93005 ELECTROCARDIOGRAM TRACING: CPT | Performed by: NURSE PRACTITIONER

## 2021-09-14 PROCEDURE — 82040 ASSAY OF SERUM ALBUMIN: CPT

## 2021-09-14 PROCEDURE — 80307 DRUG TEST PRSMV CHEM ANLYZR: CPT

## 2021-09-14 PROCEDURE — 85730 THROMBOPLASTIN TIME PARTIAL: CPT

## 2021-09-14 PROCEDURE — 94760 N-INVAS EAR/PLS OXIMETRY 1: CPT

## 2021-09-14 RX ORDER — HYDROCODONE BITARTRATE AND ACETAMINOPHEN 10; 325 MG/1; MG/1
1 TABLET ORAL 3 TIMES DAILY
COMMUNITY
End: 2021-10-07

## 2021-09-14 RX ORDER — MELOXICAM 15 MG/1
15 TABLET ORAL DAILY
COMMUNITY
Start: 2021-08-17 | End: 2021-10-07

## 2021-09-14 RX ORDER — MONTELUKAST SODIUM 10 MG/1
10 TABLET ORAL
COMMUNITY
Start: 2021-08-23 | End: 2022-08-23

## 2021-09-14 RX ORDER — ALBUTEROL SULFATE 0.83 MG/ML
2.5 SOLUTION RESPIRATORY (INHALATION)
Status: CANCELLED | OUTPATIENT
Start: 2021-09-14

## 2021-09-14 RX ORDER — METFORMIN HYDROCHLORIDE 500 MG/1
500 TABLET, EXTENDED RELEASE ORAL
COMMUNITY
Start: 2021-08-23

## 2021-09-14 RX ORDER — PANTOPRAZOLE SODIUM 40 MG/1
40 TABLET, DELAYED RELEASE ORAL
COMMUNITY
Start: 2021-06-17

## 2021-09-14 RX ORDER — PHENTERMINE HYDROCHLORIDE 37.5 MG/1
37.5 TABLET ORAL DAILY
COMMUNITY
Start: 2021-06-22

## 2021-09-14 NOTE — PERIOP NOTES
Patient verified name and . Order for consent found in EHR and matches case posting; patient verified. Type 3 surgery, joint camp assessment complete. Labs per surgeon: CBC, BMP, PT/PTT, HGB-A1C, Albumin ; results within anesthesia protocols. Urine nicotine in process; charge nurse to follow up. Labs per anesthesia protocol: no additional labs needed. EKG: completed today; within anesthesia protocols. EKG (19), Heart cath (17), Echo (19), Holter monitor (19), Stress test (16), PCP note (21), and [facility] note (19) available in EHR for reference if needed. Patient has upcoming appointment at [facility] Cardiology. Will have charge nurse follow up. Patient COVID test date 10/04/21; Patient aware. The testing center [facility], [place]  and telephone number [phone] provided to the patient if not appointment found. MRSA/MSSA swab collected; pharmacy to review and dose antibiotic as appropriate. Hospital approved surgical skin cleanser and instructions to return bottle on DOS given per hospital policy. Patient provided with handouts including Guide to Surgery, Pain Management, Hand Hygiene, Blood Transfusion Education, and [facility] Brochure. Patient answered medical/surgical history questions at their best of ability. All prior to admission medications documented in [facility]. Original medication prescription bottles NOT visualized during patient appointment. Patient instructed to hold all vitamins 3 weeks prior to surgery and NSAIDS 5 days prior to surgery. Patient teach back successful and patient demonstrates knowledge of instruction.

## 2021-09-14 NOTE — PROGRESS NOTES
Lucas Torre  : (24 y.o.) [address] at [address] [place] [address] 54, [address] [facility]  [phone]       Physical Therapy Prehab Plan of Treatment and Evaluation Summary:2021    ICD-10: Treatment Diagnosis:   · Pain in Right Hip (M25.551)  · Stiffness of Right Hip, Not elsewhere classified (M25.651)  · Difficulty in walking, Not elsewhere classified (R26.2)  · Other abnormalities of gait and mobility (R26.89)  Precautions/Allergies:   Naproxen and Ultram [tramadol]  MEDICAL/REFERRING DIAGNOSIS:  Unilateral primary osteoarthritis, right hip [M16.11]  REFERRING PHYSICIAN: Abad Haskins MD  DATE OF SURGERY: 10/6/21    Assessment:   Comments:  Pain in right hip, scheduled for right channing. Pt plans to return home following hospital stay. PROBLEM LIST (Impacting functional limitations):  Ms. Ac Ca presents with the following right lower extremity(s) problems:  1. Transfers  2. Gait  3. Strength  4. Range of Motion  5. Pain   INTERVENTIONS PLANNED:  1. Home Exercise Program  2. Educational Discussion      TREATMENT PLAN: Effective Dates: 2021 TO 2021. Frequency/Duration: Patient to continue to perform home exercise program at least twice per day up until her surgery. GOALS: (Goals have been discussed and agreed upon with patient.)  Discharge Goals: Time Frame: 1 Day  1. Patient will demonstrate independence with a home exercise program designed to increase strength, range of motion and pain control to minimize functional deficits and optimize patient for total joint replacement. Rehabilitation Potential For Stated Goals: Good  Regarding Titi Church's therapy, I certify that the treatment plan above will be carried out by a therapist or under their direction.   Thank you for this referral,  Nida Morrow, PT               HISTORY:   Present Symptoms:  Pain Intensity 1: 10  Pain Location 1: Hip  Pain Orientation 1: Right   History of Present Injury/Illness (Reason for Referral):  Medical/Referring Diagnosis: Unilateral primary osteoarthritis, right hip [M16.11]   Past Medical History/Comorbidities:   Ms. Taylor Short  has a past medical history of BMI 39.0-39.9,adult, CAD (coronary artery disease), Chronic pain, COVID-19 vaccine series completed (3/10/2021, 2/10/2021), Diabetes [address] Diabetes mellitus type 2, diet-controlled ([facility] (1/18/2017), Former smoker, GERD (gastroesophageal reflux disease), High cholesterol, History of anemia, Hypertension, Ill-defined condition, OA (osteoarthritis), Other ill-defined conditions(799.89), Other ill-defined conditions(799.89), Psychiatric disorder, Sleep apnea, Tachycardia, and V tach ([facility] She also has no past medical history of Coagulation defects, COPD, Dementia, Gastrointestinal disorder, Infectious disease, Neurological disorder, or Sleep disorder. Ms. Taylor Short  has a past surgical history that includes hx tubal ligation; pr breast surgery procedure unlisted; hx heart catheterization (01/18/2017); hx svt ablation (11/23/2014); hx cyst removal; hx cholecystectomy; and hx colonoscopy (2021). Social History/Living Environment:   Home Environment: Private residence  # Steps to Enter: 0  One/Two Story Residence: One story  Living Alone: No  Support Systems: Child(daniel); Spouse/Significant Other  Patient Expects to be Discharged to[de-identified] House  Tub or Shower Type: Shower    Work/Activity:  disabled  Dominant Side:  RIGHT  Current Medications:  See Pre-assessment nursing note   Number of Personal Factors/Comorbidities that affect the Plan of Care: 0: LOW COMPLEXITY   EXAMINATION:   ADLs (Current Functional Status):   Ambulation:  [x] Independent  [] Walk Indoors Only  [] Walk Outdoors  [] Use Assistive Device  [] Use Wheelchair Only Dressing:  [x] Independent  Requires Assistance from Someone for:  [] Sock/Shoes  [] Pants  [] Everything   Bathing/Showering:   [x] Independent  [] Requires Assistance from Someone  [] Sponge Bath Only Household Activities:  [x] Routine house and yard work  [] Light Housework Only  [] None   Observation/Orthostatic Postural Assessment:   Within defined limits   ROM/Flexibility:   Gross Assessment: Yes  AROM: Generally decreased, functional                           Strength:   Gross Assessment: Yes  Strength: Generally decreased, functional                  Functional Mobility:    Gross Assessment: Yes  Sensation: Intact    Gait Description (WDL): Within defined limits  Stand to Sit: Independent  Sit to Stand: Independent  Distance (ft): 300 Feet (ft)  Ambulation - Level of Assistance: Independent  Gait Abnormalities: Antalgic          Balance:    Sitting: Intact  Standing: Intact   Body Structures Involved:  1. Bones  2. Joints  3. Muscles  4. Ligaments Body Functions Affected:  1. Neuromusculoskeletal  2. Movement Related Activities and Participation Affected:  1. Mobility   Number of elements that affect the Plan of Care: 4+: HIGH COMPLEXITY   CLINICAL PRESENTATION:   Presentation: Stable and uncomplicated: LOW COMPLEXITY   CLINICAL DECISION MAKING:   Tool Used: Hip dysfunction and Osteoarthritis Outcome Score for Joint Replacement (HOOS, JR)  Score:  Initial: 20 (Interval: 25.103) 9/14/2021 Most Recent: 20 (Interval: 25.103) Date: 9/14   Interpretation of Score: The HOOS, JR contains 6 items from the original HOOS survey. Items are coded from 0 to 4, none to extreme respectively. Scott Waggoner is scored by summing the raw response (range 0-24) and then converting it to an interval score using the table provided below. The interval score ranges from 0 to 100 where 0 represents total hip disability and 100 represents perfect hip health. Medical Necessity:   · Ms. Sumit Heath is expected to optimize her lower extremity strength and ROM in preparation for joint replacement surgery.   Reason for Services/Other Comments:  · Achieve baseline assesment of musculoskeletal system, functional mobility and home environment. , educate in PT HEP in preparation for surgery, educate in hospital plan of care. Use of outcome tool(s) and clinical judgement create a POC that gives a: Clear prediction of patient's progress: LOW COMPLEXITY   TREATMENT:   Treatment/Session Assessment:  Patient was instructed in PT- HEP to increase strength and ROM in LEs. Answered all questions. · Post session pain:  10  · Compliance with Program/Exercises: compliant most of the time.   Total Treatment Duration:  PT Patient Time In/Time Out  Time In: 0730  Time Out: 0800    Fayetta Alpers, PT

## 2021-09-14 NOTE — PROGRESS NOTES
09/14/21 0730   Oxygen Therapy   O2 Sat (%) 98 %   Pulse via Oximetry 68 beats per minute   O2 Device None (Room air)   Pre-Treatment   Breath Sounds Bilateral Diminished   Pre FEV1 (liters) 2 liters   % Predicted 78     Initial respiratory Assessment completed with pt. Pt was interviewed and evaluated in Joint camp prior to surgery. Patient ID:  Yuval Pisano  [id number]  49 y.o.  1970  Surgeon: Dr. Maurilio Friedman  Date of Surgery: 10/6/2021  Procedure: Total Right Hip Arthroplasty  Primary Care Physician: Ethan Murphy [phone]  Specialists:     Pt taught proper COUGH technique  DIAPHRAGMATIC BREATHING EXERCISE INSTRUCTIONS GIVEN    History of smoking:   CURRENT SMOKER-    1/2      PPD for     15       YEARS  Smoking Cessation  \"SMOKING: YOUR PLAN TO QUIT\" handout given to pt. Pt taught to identify when anxiety or stress  is a trigger . Relaxation breathing technique taught to pt. Quit date:    2 WEEKS AGO- PT STATES SHE INTENDS TO NOT SMOKE AGAIN & HAS BEEN COLLECTING THE MONEY SHE WOULD HAVE SPENT ON CIGARETTES AND HAS SAVED $140 OVER 2 WEEKS. Secondhand smoke:    Past procedures with Oxygen desaturation or delayed awakening:DENIES    Past Medical History:   Diagnosis Date    BMI 39.0-39.9,adult     CAD (coronary artery disease)     per heart cath (1/18/17)= Normal coronary arteries.      Chronic pain     Back    COVID-19 vaccine series completed 3/10/2021, 2/10/2021    Moderna     Diabetes ([facility]     type 2; takes metformin prn; checks bs once a week; average 130-140; sx of low <70; A1C=6.2 on 9/14/21    Diabetes mellitus type 2, diet-controlled ([address] 1/18/2017    Former smoker     stopped on 9/1/21    GERD (gastroesophageal reflux disease)     daily med for control     High cholesterol     no meds     History of anemia     Hypertension     on med for control     Ill-defined condition     DDD    OA (osteoarthritis)     Other ill-defined conditions(799.89)     pain rt breast    Other ill-defined conditions(799.89)     chronic back pain    Psychiatric disorder     depression     Sleep apnea     c-pap nightly; instructed to bring dos    Tachycardia     SVT; s/p ablation in 2014; followed by [facility]     V [facility])         Respiratory history:DENIES SOB                                     Respiratory meds:  DENIES    FAMILY PRESENT:             NO     PAST SLEEP STUDY:        YES                 3  YEARS AGO  HX OF CONSTANTINE:                        YES                     CONSTANTINE assessment:                                               SLEEPS ON SIDE        PHYSICAL EXAM   Body mass index is 39.06 kg/m².    Visit Vitals  BP (!) 142/79 (BP 1 Location: Left upper arm, BP Patient Position: At rest;Sitting)   Pulse 66   Temp 98.1 °F (36.7 °C)   Ht 5' 6\" (1.676 m)   Wt 109.8 kg (242 lb)   SpO2 100%   BMI 39.06 kg/m²     Neck circumference:  43    cm    Loud snoring:                                                 YES             Witnessed apnea or wakening gasping or choking:             APNEA  Awakens with headaches:                                              YES  Morning or daytime tiredness/ sleepiness:                               TIRED  Dry mouth or sore throat in morning:            YES                                            Coulter stage:  4                                   SACS score:42  Stop Bang   STOP-BANG  Does the patient snore loudly (louder than talking or loud enough to be heard through closed doors)?: Yes  Does the patient often feel tired, fatigued, or sleepy during the daytime, even after a \"good\" night's sleep?: Yes  Has anyone ever observed the patient stop breathing during their sleep? : Yes  Does the patient have or are they being treated for high blood pressure?: Yes  Is the patient's BMI greater than 35?: Yes  Is your neck circumference greater than 17 inches (Male) or 16 inches (Female)?: Yes  Is the patient older than 50?: Yes  Is the patient male?: No  CONSTANTINE Score: 7  Has the patient been referred to Sleep Medicine?: No  Has the patient previously been diagnosed with Obstructive Sleep Apnea?: Yes  Treated or Untreated?: Treated                            Pt. Is positive for CONSTANTINE and uses HOME CPAP and will bring to Hospital day of surgery. PT WILL NEED ASSISTANCE PLACING CPAP ON HS DURING HOSPITALIZATION.                                         Referrals:    Pt. Phone Number:

## 2021-09-14 NOTE — PERIOP NOTES
PLEASE CONTINUE TAKING ALL PRESCRIPTION MEDICATIONS UP TO THE DAY OF SURGERY UNLESS OTHERWISE DIRECTED BELOW. DISCONTINUE all vitamins, herbals, and supplements 21 days prior to surgery. DISCONTINUE Non-Steriodal Anti-Inflammatory (NSAIDS) such as Advil, Ibuprofen, Motrin, Aspirin, Naproxen, and Aleve FIVE days prior to surgery. Home Medications to take  the day of surgery (10/06/21)   Norco     Metoprolol   Protonix    Venlafaxine            Home Medications   to Hold     Meloxicam & Phentermine=do not take for 5 full days prior to surgery         Comments    Covid test 10/04/21 (9:55 am) @ [address] [facility]       Bring: [employer], Incentive Spirometer, Photo ID, Insurance card, c-pap, Metformin     Please do not bring home medications with you on the day of surgery unless otherwise directed by your nurse. If you are instructed to bring home medications, please give them to your nurse as they will be administered by the nursing staff. If you have any questions, please call [facility] [phone]. A copy of this note was provided to the patient for reference.

## 2021-09-14 NOTE — PERIOP NOTES
Easton Moya MD    Your patient recently had labs drawn during a hospital appointment due to an upcoming surgery. The results are attached. If you have any questions or concerns please reach out to your patient for a follow-up in your office. Please do not respond to this message as my mailbox is not monitored. You may call [phone] with questions or concerns. Recent Results (from the past 12 hour(s))   CBC WITH AUTOMATED DIFF    Collection Time: 09/14/21  7:37 AM   Result Value Ref Range    WBC 10.3 4.3 - 11.1 K/uL    RBC 4.87 4.05 - 5.2 M/uL    HGB 13.1 11.7 - 15.4 g/dL    HCT 41.9 35.8 - 46.3 %    MCV 86.0 79.6 - 97.8 FL    MCH 26.9 26.1 - 32.9 PG    MCHC 31.3 (L) 31.4 - 35.0 g/dL    RDW 13.9 11.9 - 14.6 %    PLATELET 629 (H) 509 - 450 K/uL    MPV 10.6 9.4 - 12.3 FL    ABSOLUTE NRBC 0.00 0.0 - 0.2 K/uL    DF AUTOMATED      NEUTROPHILS 49 43 - 78 %    LYMPHOCYTES 38 13 - 44 %    MONOCYTES 8 4.0 - 12.0 %    EOSINOPHILS 4 0.5 - 7.8 %    BASOPHILS 1 0.0 - 2.0 %    IMMATURE GRANULOCYTES 0 0.0 - 5.0 %    ABS. NEUTROPHILS 5.0 1.7 - 8.2 K/UL    ABS. LYMPHOCYTES 3.9 0.5 - 4.6 K/UL    ABS. MONOCYTES 0.8 0.1 - 1.3 K/UL    ABS. EOSINOPHILS 0.4 0.0 - 0.8 K/UL    ABS. BASOPHILS 0.1 0.0 - 0.2 K/UL    ABS. IMM.  GRANS. 0.0 0.0 - 0.5 K/UL   HEMOGLOBIN A1C WITH EAG    Collection Time: 09/14/21  7:37 AM   Result Value Ref Range    Hemoglobin A1c 6.2 4.2 - 6.3 %    Est. average glucose 707 mg/dL   METABOLIC PANEL, BASIC    Collection Time: 09/14/21  7:37 AM   Result Value Ref Range    Sodium 140 136 - 145 mmol/L    Potassium 3.9 3.5 - 5.1 mmol/L    Chloride 106 98 - 107 mmol/L    CO2 27 21 - 32 mmol/L    Anion gap 7 7 - 16 mmol/L    Glucose 119 (H) 65 - 100 mg/dL    BUN 10 6 - 23 MG/DL    Creatinine 0.88 0.6 - 1.0 MG/DL    GFR est AA >60 >60 ml/min/1.73m2    GFR est non-AA >60 >60 ml/min/1.73m2    Calcium 9.1 8.3 - 10.4 MG/DL   PROTHROMBIN TIME + INR    Collection Time: 09/14/21  7:37 AM   Result Value Ref Range Prothrombin time 13.9 12.6 - 14.5 sec    INR 1.0     PTT    Collection Time: 09/14/21  7:37 AM   Result Value Ref Range    aPTT 31.4 24.1 - 35.1 SEC   ALBUMIN    Collection Time: 09/14/21  7:37 AM   Result Value Ref Range    Albumin 3.3 (L) 3.5 - 5.0 g/dL

## 2021-09-15 LAB
COTININE UR QL SCN: NEGATIVE NG/ML
DRUG SCREEN COMMENT:, 753798: NORMAL

## 2021-09-16 NOTE — PERIOP NOTES
NICOTINE/COTININE, UR, QT  Order: [id number]  Collected:  9/14/2021 07:40 Status:  Final result   0 Result Notes   Ref Range & Units 9/14/21 0740 Resulting Agency   Cotinine Screen, urine Fwaksy=420 ng/mL Negative  [facility]TS RTP   Drug Screen Comment:   Comment  [facility]   Comment: (NOTE)   This analysis is performed by immunoassay. Positive findings are   unconfirmed analytical test results; if results do not support   expected clinical finding, confirmation by an alternate methodology   is recommended. Patient metabolic variables, specific drug chemistry,   and specimen characteristics can affect test outcome. Technical consultation is available at [email or web address], or   call toll free [phone].    Performed At: [address] [id number]   Lisa Gavin MD GP:[id number]   Performed At: [facility] [place] [address] [facility] [place], [place] [id number]   Kendra Valentin PhD DN:[id number]          Specimen Collected: 09/14/21 07:40 Last Resulted: 09/15/21 18:35

## 2021-09-30 NOTE — PERIOP NOTES
:46 PM    [address] [id number]    PRE-OP RISK ASSESSMENT     Patient is scheduled for Right ROBERTA  with Dr. Dottie Quick at [address] [place] [address] on 10/26/21.   Office is asking to receive cardiovascular clearance.       Patient is considered LOW cardiovascular risk for this moderate risk procedure.        Other comments:      Low CV risk for a low to moderate risk surgery          Sincerely,      Kelly Sanders MD

## 2021-10-05 ENCOUNTER — ANESTHESIA EVENT (OUTPATIENT)
Dept: SURGERY | Age: 51
End: 2021-10-05
Payer: COMMERCIAL

## 2021-10-06 ENCOUNTER — HOME HEALTH ADMISSION (OUTPATIENT)
Dept: HOME HEALTH SERVICES | Facility: HOME HEALTH | Age: 51
End: 2021-10-06

## 2021-10-06 ENCOUNTER — ANESTHESIA (OUTPATIENT)
Dept: SURGERY | Age: 51
End: 2021-10-06
Payer: COMMERCIAL

## 2021-10-06 ENCOUNTER — HOSPITAL ENCOUNTER (OUTPATIENT)
Age: 51
LOS: 1 days | Discharge: HOME HEALTH | End: 2021-10-07
Attending: ORTHOPAEDIC SURGERY | Admitting: ORTHOPAEDIC SURGERY
Payer: COMMERCIAL

## 2021-10-06 PROBLEM — M16.11 OSTEOARTHRITIS OF RIGHT HIP: Status: ACTIVE | Noted: 2021-10-06

## 2021-10-06 LAB
GLUCOSE BLD STRIP.AUTO-MCNC: 118 MG/DL (ref 65–100)
GLUCOSE BLD STRIP.AUTO-MCNC: 235 MG/DL (ref 65–100)
HGB BLD-MCNC: 11.6 G/DL (ref 11.7–15.4)
SERVICE CMNT-IMP: ABNORMAL
SERVICE CMNT-IMP: ABNORMAL

## 2021-10-06 PROCEDURE — 74011250637 HC RX REV CODE- 250/637: Performed by: NURSE PRACTITIONER

## 2021-10-06 PROCEDURE — 77030018836 HC SOL IRR NACL ICUM -A: Performed by: ORTHOPAEDIC SURGERY

## 2021-10-06 PROCEDURE — 74011250636 HC RX REV CODE- 250/636: Performed by: ANESTHESIOLOGY

## 2021-10-06 PROCEDURE — 74011250636 HC RX REV CODE- 250/636: Performed by: NURSE PRACTITIONER

## 2021-10-06 PROCEDURE — 77030002922 HC SUT FBRWRE ARTH -B: Performed by: ORTHOPAEDIC SURGERY

## 2021-10-06 PROCEDURE — 97165 OT EVAL LOW COMPLEX 30 MIN: CPT

## 2021-10-06 PROCEDURE — 76060000035 HC ANESTHESIA 2 TO 2.5 HR: Performed by: ORTHOPAEDIC SURGERY

## 2021-10-06 PROCEDURE — 77030006835 HC BLD SAW SAG STRY -B: Performed by: ORTHOPAEDIC SURGERY

## 2021-10-06 PROCEDURE — 82962 GLUCOSE BLOOD TEST: CPT

## 2021-10-06 PROCEDURE — 36416 COLLJ CAPILLARY BLOOD SPEC: CPT

## 2021-10-06 PROCEDURE — 77030034479 HC ADH SKN CLSR PRINEO J&J -B: Performed by: ORTHOPAEDIC SURGERY

## 2021-10-06 PROCEDURE — 94760 N-INVAS EAR/PLS OXIMETRY 1: CPT

## 2021-10-06 PROCEDURE — 74011250636 HC RX REV CODE- 250/636: Performed by: ORTHOPAEDIC SURGERY

## 2021-10-06 PROCEDURE — 77030038692 HC WND DEB SYS IRMX -B: Performed by: ORTHOPAEDIC SURGERY

## 2021-10-06 PROCEDURE — 74011000250 HC RX REV CODE- 250: Performed by: ORTHOPAEDIC SURGERY

## 2021-10-06 PROCEDURE — 77030019557 HC ELECTRD VES SEAL MEDT -F: Performed by: ORTHOPAEDIC SURGERY

## 2021-10-06 PROCEDURE — 74011250636 HC RX REV CODE- 250/636: Performed by: NURSE ANESTHETIST, CERTIFIED REGISTERED

## 2021-10-06 PROCEDURE — 2709999900 HC NON-CHARGEABLE SUPPLY: Performed by: ORTHOPAEDIC SURGERY

## 2021-10-06 PROCEDURE — 2709999900 HC NON-CHARGEABLE SUPPLY

## 2021-10-06 PROCEDURE — 77030033138 HC SUT PGA STRATFX J&J -B: Performed by: ORTHOPAEDIC SURGERY

## 2021-10-06 PROCEDURE — 77010033678 HC OXYGEN DAILY

## 2021-10-06 PROCEDURE — 74011000250 HC RX REV CODE- 250: Performed by: NURSE ANESTHETIST, CERTIFIED REGISTERED

## 2021-10-06 PROCEDURE — 76210000006 HC OR PH I REC 0.5 TO 1 HR: Performed by: ORTHOPAEDIC SURGERY

## 2021-10-06 PROCEDURE — 85018 HEMOGLOBIN: CPT

## 2021-10-06 PROCEDURE — C1713 ANCHOR/SCREW BN/BN,TIS/BN: HCPCS | Performed by: ORTHOPAEDIC SURGERY

## 2021-10-06 PROCEDURE — 77030002933 HC SUT MCRYL J&J -A: Performed by: ORTHOPAEDIC SURGERY

## 2021-10-06 PROCEDURE — 77030037088 HC TUBE ENDOTRACH ORAL NSL COVD-A: Performed by: ANESTHESIOLOGY

## 2021-10-06 PROCEDURE — 77030018673: Performed by: ORTHOPAEDIC SURGERY

## 2021-10-06 PROCEDURE — 77030033067 HC SUT PDO STRATFX SPIR J&J -B: Performed by: ORTHOPAEDIC SURGERY

## 2021-10-06 PROCEDURE — 97161 PT EVAL LOW COMPLEX 20 MIN: CPT

## 2021-10-06 PROCEDURE — 77030040922 HC BLNKT HYPOTHRM STRY -A: Performed by: ANESTHESIOLOGY

## 2021-10-06 PROCEDURE — 76010000171 HC OR TIME 2 TO 2.5 HR INTENSV-TIER 1: Performed by: ORTHOPAEDIC SURGERY

## 2021-10-06 PROCEDURE — 77030039425 HC BLD LARYNG TRULITE DISP TELE -A: Performed by: ANESTHESIOLOGY

## 2021-10-06 RX ORDER — TRANEXAMIC ACID 100 MG/ML
INJECTION, SOLUTION INTRAVENOUS AS NEEDED
Status: DISCONTINUED | OUTPATIENT
Start: 2021-10-06 | End: 2021-10-06 | Stop reason: HOSPADM

## 2021-10-06 RX ORDER — GABAPENTIN 300 MG/1
300 CAPSULE ORAL
Status: DISCONTINUED | OUTPATIENT
Start: 2021-10-06 | End: 2021-10-07 | Stop reason: HOSPADM

## 2021-10-06 RX ORDER — CYCLOBENZAPRINE HCL 10 MG
5 TABLET ORAL
Status: DISCONTINUED | OUTPATIENT
Start: 2021-10-06 | End: 2021-10-07 | Stop reason: HOSPADM

## 2021-10-06 RX ORDER — FENTANYL CITRATE 50 UG/ML
INJECTION, SOLUTION INTRAMUSCULAR; INTRAVENOUS AS NEEDED
Status: DISCONTINUED | OUTPATIENT
Start: 2021-10-06 | End: 2021-10-06 | Stop reason: HOSPADM

## 2021-10-06 RX ORDER — GLYCOPYRROLATE 0.2 MG/ML
INJECTION INTRAMUSCULAR; INTRAVENOUS AS NEEDED
Status: DISCONTINUED | OUTPATIENT
Start: 2021-10-06 | End: 2021-10-06 | Stop reason: HOSPADM

## 2021-10-06 RX ORDER — DEXAMETHASONE SODIUM PHOSPHATE 100 MG/10ML
INJECTION INTRAMUSCULAR; INTRAVENOUS AS NEEDED
Status: DISCONTINUED | OUTPATIENT
Start: 2021-10-06 | End: 2021-10-06 | Stop reason: HOSPADM

## 2021-10-06 RX ORDER — INSULIN LISPRO 100 [IU]/ML
INJECTION, SOLUTION INTRAVENOUS; SUBCUTANEOUS
Status: DISCONTINUED | OUTPATIENT
Start: 2021-10-06 | End: 2021-10-07 | Stop reason: HOSPADM

## 2021-10-06 RX ORDER — ROPIVACAINE HYDROCHLORIDE 2 MG/ML
INJECTION, SOLUTION EPIDURAL; INFILTRATION; PERINEURAL AS NEEDED
Status: DISCONTINUED | OUTPATIENT
Start: 2021-10-06 | End: 2021-10-06 | Stop reason: HOSPADM

## 2021-10-06 RX ORDER — SODIUM CHLORIDE 0.9 % (FLUSH) 0.9 %
5-40 SYRINGE (ML) INJECTION EVERY 8 HOURS
Status: DISCONTINUED | OUTPATIENT
Start: 2021-10-06 | End: 2021-10-07 | Stop reason: HOSPADM

## 2021-10-06 RX ORDER — NEOSTIGMINE METHYLSULFATE 1 MG/ML
INJECTION, SOLUTION INTRAVENOUS AS NEEDED
Status: DISCONTINUED | OUTPATIENT
Start: 2021-10-06 | End: 2021-10-06 | Stop reason: HOSPADM

## 2021-10-06 RX ORDER — HYDROMORPHONE HYDROCHLORIDE 1 MG/ML
1 INJECTION, SOLUTION INTRAMUSCULAR; INTRAVENOUS; SUBCUTANEOUS
Status: DISCONTINUED | OUTPATIENT
Start: 2021-10-06 | End: 2021-10-07 | Stop reason: HOSPADM

## 2021-10-06 RX ORDER — FENTANYL CITRATE 50 UG/ML
100 INJECTION, SOLUTION INTRAMUSCULAR; INTRAVENOUS ONCE
Status: DISCONTINUED | OUTPATIENT
Start: 2021-10-06 | End: 2021-10-06 | Stop reason: HOSPADM

## 2021-10-06 RX ORDER — LIDOCAINE HYDROCHLORIDE 10 MG/ML
0.1 INJECTION INFILTRATION; PERINEURAL AS NEEDED
Status: DISCONTINUED | OUTPATIENT
Start: 2021-10-06 | End: 2021-10-06 | Stop reason: HOSPADM

## 2021-10-06 RX ORDER — HYDROCHLOROTHIAZIDE 25 MG/1
25 TABLET ORAL DAILY
Status: DISCONTINUED | OUTPATIENT
Start: 2021-10-07 | End: 2021-10-07 | Stop reason: HOSPADM

## 2021-10-06 RX ORDER — DEXAMETHASONE SODIUM PHOSPHATE 100 MG/10ML
10 INJECTION INTRAMUSCULAR; INTRAVENOUS ONCE
Status: CANCELLED | OUTPATIENT
Start: 2021-10-07 | End: 2021-10-07

## 2021-10-06 RX ORDER — MELOXICAM 7.5 MG/1
15 TABLET ORAL DAILY
Status: DISCONTINUED | OUTPATIENT
Start: 2021-10-07 | End: 2021-10-06 | Stop reason: DRUGHIGH

## 2021-10-06 RX ORDER — ONDANSETRON 2 MG/ML
INJECTION INTRAMUSCULAR; INTRAVENOUS AS NEEDED
Status: DISCONTINUED | OUTPATIENT
Start: 2021-10-06 | End: 2021-10-06 | Stop reason: HOSPADM

## 2021-10-06 RX ORDER — ONDANSETRON 8 MG/1
8 TABLET, ORALLY DISINTEGRATING ORAL
Status: DISCONTINUED | OUTPATIENT
Start: 2021-10-06 | End: 2021-10-07 | Stop reason: HOSPADM

## 2021-10-06 RX ORDER — PANTOPRAZOLE SODIUM 40 MG/1
40 TABLET, DELAYED RELEASE ORAL
Status: DISCONTINUED | OUTPATIENT
Start: 2021-10-07 | End: 2021-10-07 | Stop reason: HOSPADM

## 2021-10-06 RX ORDER — TRANEXAMIC ACID 650 1/1
1300 TABLET ORAL
Status: COMPLETED | OUTPATIENT
Start: 2021-10-06 | End: 2021-10-06

## 2021-10-06 RX ORDER — HYDROMORPHONE HYDROCHLORIDE 2 MG/ML
INJECTION, SOLUTION INTRAMUSCULAR; INTRAVENOUS; SUBCUTANEOUS AS NEEDED
Status: DISCONTINUED | OUTPATIENT
Start: 2021-10-06 | End: 2021-10-06 | Stop reason: HOSPADM

## 2021-10-06 RX ORDER — ACETAMINOPHEN 500 MG
1000 TABLET ORAL ONCE
Status: DISCONTINUED | OUTPATIENT
Start: 2021-10-06 | End: 2021-10-06 | Stop reason: HOSPADM

## 2021-10-06 RX ORDER — LIDOCAINE HYDROCHLORIDE 20 MG/ML
INJECTION, SOLUTION EPIDURAL; INFILTRATION; INTRACAUDAL; PERINEURAL AS NEEDED
Status: DISCONTINUED | OUTPATIENT
Start: 2021-10-06 | End: 2021-10-06 | Stop reason: HOSPADM

## 2021-10-06 RX ORDER — MIDAZOLAM HYDROCHLORIDE 1 MG/ML
2 INJECTION, SOLUTION INTRAMUSCULAR; INTRAVENOUS ONCE
Status: DISCONTINUED | OUTPATIENT
Start: 2021-10-06 | End: 2021-10-06 | Stop reason: HOSPADM

## 2021-10-06 RX ORDER — ROCURONIUM BROMIDE 10 MG/ML
INJECTION, SOLUTION INTRAVENOUS AS NEEDED
Status: DISCONTINUED | OUTPATIENT
Start: 2021-10-06 | End: 2021-10-06 | Stop reason: HOSPADM

## 2021-10-06 RX ORDER — ONDANSETRON 4 MG/1
4 TABLET, ORALLY DISINTEGRATING ORAL
Status: DISCONTINUED | OUTPATIENT
Start: 2021-10-06 | End: 2021-10-07 | Stop reason: HOSPADM

## 2021-10-06 RX ORDER — MIDAZOLAM HYDROCHLORIDE 1 MG/ML
2 INJECTION, SOLUTION INTRAMUSCULAR; INTRAVENOUS
Status: COMPLETED | OUTPATIENT
Start: 2021-10-06 | End: 2021-10-06

## 2021-10-06 RX ORDER — DIPHENHYDRAMINE HCL 25 MG
25 CAPSULE ORAL
Status: DISCONTINUED | OUTPATIENT
Start: 2021-10-06 | End: 2021-10-07 | Stop reason: HOSPADM

## 2021-10-06 RX ORDER — METOPROLOL SUCCINATE 50 MG/1
50 TABLET, EXTENDED RELEASE ORAL DAILY
Status: DISCONTINUED | OUTPATIENT
Start: 2021-10-07 | End: 2021-10-07 | Stop reason: HOSPADM

## 2021-10-06 RX ORDER — SODIUM CHLORIDE 9 MG/ML
100 INJECTION, SOLUTION INTRAVENOUS CONTINUOUS
Status: DISCONTINUED | OUTPATIENT
Start: 2021-10-06 | End: 2021-10-07 | Stop reason: HOSPADM

## 2021-10-06 RX ORDER — ZOLPIDEM TARTRATE 5 MG/1
5 TABLET ORAL
Status: DISCONTINUED | OUTPATIENT
Start: 2021-10-06 | End: 2021-10-07 | Stop reason: HOSPADM

## 2021-10-06 RX ORDER — SODIUM CHLORIDE 0.9 % (FLUSH) 0.9 %
5-40 SYRINGE (ML) INJECTION AS NEEDED
Status: DISCONTINUED | OUTPATIENT
Start: 2021-10-06 | End: 2021-10-07 | Stop reason: HOSPADM

## 2021-10-06 RX ORDER — CEFAZOLIN SODIUM/WATER 2 G/20 ML
2 SYRINGE (ML) INTRAVENOUS EVERY 8 HOURS
Status: COMPLETED | OUTPATIENT
Start: 2021-10-06 | End: 2021-10-07

## 2021-10-06 RX ORDER — PROPOFOL 10 MG/ML
INJECTION, EMULSION INTRAVENOUS AS NEEDED
Status: DISCONTINUED | OUTPATIENT
Start: 2021-10-06 | End: 2021-10-06 | Stop reason: HOSPADM

## 2021-10-06 RX ORDER — ACETAMINOPHEN 500 MG
1000 TABLET ORAL EVERY 6 HOURS
Status: DISCONTINUED | OUTPATIENT
Start: 2021-10-06 | End: 2021-10-07 | Stop reason: HOSPADM

## 2021-10-06 RX ORDER — AMOXICILLIN 250 MG
2 CAPSULE ORAL DAILY
Status: DISCONTINUED | OUTPATIENT
Start: 2021-10-07 | End: 2021-10-07 | Stop reason: HOSPADM

## 2021-10-06 RX ORDER — ALBUTEROL SULFATE 0.83 MG/ML
2.5 SOLUTION RESPIRATORY (INHALATION)
Status: DISCONTINUED | OUTPATIENT
Start: 2021-10-06 | End: 2021-10-07 | Stop reason: HOSPADM

## 2021-10-06 RX ORDER — EPHEDRINE SULFATE/0.9% NACL/PF 50 MG/5 ML
SYRINGE (ML) INTRAVENOUS AS NEEDED
Status: DISCONTINUED | OUTPATIENT
Start: 2021-10-06 | End: 2021-10-06 | Stop reason: HOSPADM

## 2021-10-06 RX ORDER — SODIUM CHLORIDE, SODIUM LACTATE, POTASSIUM CHLORIDE, CALCIUM CHLORIDE 600; 310; 30; 20 MG/100ML; MG/100ML; MG/100ML; MG/100ML
100 INJECTION, SOLUTION INTRAVENOUS CONTINUOUS
Status: DISCONTINUED | OUTPATIENT
Start: 2021-10-06 | End: 2021-10-06 | Stop reason: HOSPADM

## 2021-10-06 RX ORDER — VENLAFAXINE HYDROCHLORIDE 150 MG/1
150 CAPSULE, EXTENDED RELEASE ORAL DAILY
Status: DISCONTINUED | OUTPATIENT
Start: 2021-10-07 | End: 2021-10-07 | Stop reason: HOSPADM

## 2021-10-06 RX ORDER — ASPIRIN 81 MG/1
81 TABLET ORAL EVERY 12 HOURS
Status: DISCONTINUED | OUTPATIENT
Start: 2021-10-06 | End: 2021-10-07 | Stop reason: HOSPADM

## 2021-10-06 RX ORDER — MONTELUKAST SODIUM 10 MG/1
10 TABLET ORAL
Status: DISCONTINUED | OUTPATIENT
Start: 2021-10-06 | End: 2021-10-07 | Stop reason: HOSPADM

## 2021-10-06 RX ORDER — CEFAZOLIN SODIUM/WATER 2 G/20 ML
2 SYRINGE (ML) INTRAVENOUS ONCE
Status: COMPLETED | OUTPATIENT
Start: 2021-10-06 | End: 2021-10-06

## 2021-10-06 RX ORDER — NALOXONE HYDROCHLORIDE 0.4 MG/ML
0.04 INJECTION, SOLUTION INTRAMUSCULAR; INTRAVENOUS; SUBCUTANEOUS
Status: DISCONTINUED | OUTPATIENT
Start: 2021-10-06 | End: 2021-10-06 | Stop reason: HOSPADM

## 2021-10-06 RX ORDER — HYDROMORPHONE HYDROCHLORIDE 2 MG/ML
0.5 INJECTION, SOLUTION INTRAMUSCULAR; INTRAVENOUS; SUBCUTANEOUS
Status: DISCONTINUED | OUTPATIENT
Start: 2021-10-06 | End: 2021-10-06 | Stop reason: HOSPADM

## 2021-10-06 RX ORDER — HYDROMORPHONE HYDROCHLORIDE 2 MG/1
2 TABLET ORAL
Status: DISCONTINUED | OUTPATIENT
Start: 2021-10-06 | End: 2021-10-07 | Stop reason: HOSPADM

## 2021-10-06 RX ORDER — NALOXONE HYDROCHLORIDE 0.4 MG/ML
.2-.4 INJECTION, SOLUTION INTRAMUSCULAR; INTRAVENOUS; SUBCUTANEOUS
Status: DISCONTINUED | OUTPATIENT
Start: 2021-10-06 | End: 2021-10-07 | Stop reason: HOSPADM

## 2021-10-06 RX ORDER — KETOROLAC TROMETHAMINE 15 MG/ML
15 INJECTION, SOLUTION INTRAMUSCULAR; INTRAVENOUS EVERY 8 HOURS
Status: COMPLETED | OUTPATIENT
Start: 2021-10-06 | End: 2021-10-07

## 2021-10-06 RX ORDER — METFORMIN HYDROCHLORIDE 500 MG/1
500 TABLET, EXTENDED RELEASE ORAL
Status: DISCONTINUED | OUTPATIENT
Start: 2021-10-06 | End: 2021-10-07 | Stop reason: HOSPADM

## 2021-10-06 RX ADMIN — Medication 10 MG: at 12:03

## 2021-10-06 RX ADMIN — HYDROMORPHONE HYDROCHLORIDE 1 MG: 1 INJECTION, SOLUTION INTRAMUSCULAR; INTRAVENOUS; SUBCUTANEOUS at 15:47

## 2021-10-06 RX ADMIN — ACETAMINOPHEN 1000 MG: 500 TABLET, FILM COATED ORAL at 18:42

## 2021-10-06 RX ADMIN — Medication 1 AMPULE: at 20:30

## 2021-10-06 RX ADMIN — ONDANSETRON 4 MG: 2 INJECTION INTRAMUSCULAR; INTRAVENOUS at 11:28

## 2021-10-06 RX ADMIN — HYDROMORPHONE HYDROCHLORIDE 0.4 MG: 2 INJECTION INTRAMUSCULAR; INTRAVENOUS; SUBCUTANEOUS at 12:56

## 2021-10-06 RX ADMIN — HYDROMORPHONE HYDROCHLORIDE 0.2 MG: 2 INJECTION INTRAMUSCULAR; INTRAVENOUS; SUBCUTANEOUS at 13:27

## 2021-10-06 RX ADMIN — TRANEXAMIC ACID 1 G: 100 INJECTION, SOLUTION INTRAVENOUS at 11:45

## 2021-10-06 RX ADMIN — SODIUM CHLORIDE, SODIUM LACTATE, POTASSIUM CHLORIDE, AND CALCIUM CHLORIDE: 600; 310; 30; 20 INJECTION, SOLUTION INTRAVENOUS at 12:17

## 2021-10-06 RX ADMIN — DEXAMETHASONE SODIUM PHOSPHATE 10 MG: 10 INJECTION INTRAMUSCULAR; INTRAVENOUS at 11:28

## 2021-10-06 RX ADMIN — CEFAZOLIN 1 G: 1 INJECTION, POWDER, FOR SOLUTION INTRAVENOUS at 11:58

## 2021-10-06 RX ADMIN — Medication 10 ML: at 18:47

## 2021-10-06 RX ADMIN — TRANEXAMIC ACID 1300 MG: 650 TABLET ORAL at 18:42

## 2021-10-06 RX ADMIN — TRANEXAMIC ACID 1 G: 100 INJECTION, SOLUTION INTRAVENOUS at 12:06

## 2021-10-06 RX ADMIN — HYDROMORPHONE HYDROCHLORIDE 2 MG: 2 TABLET ORAL at 20:30

## 2021-10-06 RX ADMIN — PROPOFOL 150 MG: 10 INJECTION, EMULSION INTRAVENOUS at 11:20

## 2021-10-06 RX ADMIN — HYDROMORPHONE HYDROCHLORIDE 0.4 MG: 2 INJECTION INTRAMUSCULAR; INTRAVENOUS; SUBCUTANEOUS at 13:41

## 2021-10-06 RX ADMIN — Medication 81 MG: at 20:31

## 2021-10-06 RX ADMIN — CEFAZOLIN 2 G: 1 INJECTION, POWDER, FOR SOLUTION INTRAVENOUS at 11:28

## 2021-10-06 RX ADMIN — Medication 3 MG: at 13:03

## 2021-10-06 RX ADMIN — CEFAZOLIN SODIUM 2 G: 100 INJECTION, POWDER, LYOPHILIZED, FOR SOLUTION INTRAVENOUS at 20:31

## 2021-10-06 RX ADMIN — SODIUM CHLORIDE, SODIUM LACTATE, POTASSIUM CHLORIDE, AND CALCIUM CHLORIDE 100 ML/HR: 600; 310; 30; 20 INJECTION, SOLUTION INTRAVENOUS at 09:36

## 2021-10-06 RX ADMIN — SODIUM CHLORIDE, SODIUM LACTATE, POTASSIUM CHLORIDE, AND CALCIUM CHLORIDE: 600; 310; 30; 20 INJECTION, SOLUTION INTRAVENOUS at 13:27

## 2021-10-06 RX ADMIN — LIDOCAINE HYDROCHLORIDE 60 MG: 20 INJECTION, SOLUTION EPIDURAL; INFILTRATION; INTRACAUDAL; PERINEURAL at 11:20

## 2021-10-06 RX ADMIN — KETOROLAC TROMETHAMINE 15 MG: 15 INJECTION, SOLUTION INTRAMUSCULAR; INTRAVENOUS at 18:42

## 2021-10-06 RX ADMIN — ROCURONIUM BROMIDE 50 MG: 10 INJECTION, SOLUTION INTRAVENOUS at 11:21

## 2021-10-06 RX ADMIN — METFORMIN HYDROCHLORIDE 500 MG: 500 TABLET, EXTENDED RELEASE ORAL at 18:42

## 2021-10-06 RX ADMIN — FENTANYL CITRATE 100 MCG: 50 INJECTION INTRAMUSCULAR; INTRAVENOUS at 11:20

## 2021-10-06 RX ADMIN — TRANEXAMIC ACID 1300 MG: 650 TABLET ORAL at 20:30

## 2021-10-06 RX ADMIN — GLYCOPYRROLATE 0.4 MG: 0.2 INJECTION, SOLUTION INTRAMUSCULAR; INTRAVENOUS at 13:03

## 2021-10-06 RX ADMIN — HYDROMORPHONE HYDROCHLORIDE 0.4 MG: 2 INJECTION INTRAMUSCULAR; INTRAVENOUS; SUBCUTANEOUS at 13:06

## 2021-10-06 RX ADMIN — Medication 3 AMPULE: at 09:36

## 2021-10-06 RX ADMIN — MIDAZOLAM 2 MG: 1 INJECTION INTRAMUSCULAR; INTRAVENOUS at 09:53

## 2021-10-06 NOTE — H&P
Patient ID:  Carla Christianson  [id number]  82 y.o.  1970    Today: October 6, 2021       CC:  Right hip pain    HPI:   The patient has end stage arthritis of the right hip. The patient was evaluated and examined in the office prior to today and was found to have a history on physical exam consistent with intra-articular pathology of the right hip. Patient complains of anterior groin pain predominately. Pain occurs during activity. Patient has difficulty putting on socks/shoes and has notable pain when getting up from a chair and getting out of a car. Patient does not complain of significant lateral hip pain or radicular pain down the leg. There have been no changes to the patient's orthopedic condition since the last office visit    Past Medical History:  Past Medical History:   Diagnosis Date    BMI 39.0-39.9,adult     CAD (coronary artery disease)     per heart cath (1/18/17)= Normal coronary arteries.      Chronic pain     Back    COVID-19 vaccine series completed 3/10/2021, 2/10/2021    Moderna     Diabetes ([address]     type 2; takes metformin prn; checks bs once a week; average 130-140; sx of low <70; A1C=6.2 on 9/14/21    Diabetes mellitus type 2, diet-controlled ([address] 1/18/2017    Former smoker     stopped on 9/1/21    GERD (gastroesophageal reflux disease)     daily med for control     High cholesterol     no meds     History of anemia     Hypertension     on med for control     Ill-defined condition     DDD    OA (osteoarthritis)     Other ill-defined conditions(799.89)     pain rt breast    Other ill-defined conditions(799.89)     chronic back pain    Psychiatric disorder     depression     Sleep apnea     c-pap nightly; instructed to bring dos    Tachycardia     SVT; s/p ablation in 2014; followed by [facility]     [facility])        Past Surgical History:  Past Surgical History:   Procedure Laterality Date    HX CHOLECYSTECTOMY      HX COLONOSCOPY  2021    HX CYST REMOVAL      left hand  ganglion    HX HEART CATHETERIZATION  01/18/2017    Normal coronary arteries.  HX SVT ABLATION  11/23/2014    HX TUBAL LIGATION      AK BREAST SURGERY PROCEDURE UNLISTED      excision right breast ducts        Medications:     Prior to Admission medications    Medication Sig Start Date End Date Taking? Authorizing Provider   meloxicam (MOBIC) 15 mg tablet Take 15 mg by mouth daily. 8/17/21   Provider, Historical   metFORMIN ER (GLUCOPHAGE XR) 500 mg tablet Take 500 mg by mouth nightly as needed. 8/23/21   Provider, Historical   montelukast (SINGULAIR) 10 mg tablet Take 10 mg by mouth nightly. 8/23/21 8/23/22  Provider, Historical   pantoprazole (PROTONIX) 40 mg tablet Take 40 mg by mouth Daily (before breakfast). 6/17/21   Provider, Historical   phentermine (ADIPEX-P) 37.5 mg tablet Take 37.5 mg by mouth daily. Patient not taking: Reported on 9/28/2021 6/22/21   Provider, Historical   HYDROcodone-acetaminophen (Norco)  mg tablet Take 1 Tablet by mouth three (3) times daily. Indications: pain    Provider, Historical   venlafaxine-SR (EFFEXOR-XR) 150 mg capsule Take 150 mg by mouth daily. 9/18/18   Manohar Abbott MD   Blood-Glucose Meter monitoring kit Use as instructed. Patient's choice of glucometer. 4/27/17   Manohar Abbott MD   gabapentin (NEURONTIN) 300 mg capsule Take 300 mg by mouth nightly. 1/22/19   Manohar Abbott MD   hydroCHLOROthiazide (HYDRODIURIL) 25 mg tablet Take 25 mg by mouth daily. 7/16/15   Manohar Abbott MD   metoprolol succinate (TOPROL-XL) 50 mg XL tablet Take 50 mg by mouth daily. 7/16/15   Manohar Abbott MD   EPINEPHrine (ADRENACLICK) 0.3 NO/1.2 mL injection 0.3 mL by IntraMUSCular route once as needed for up to 1 dose.  5/3/17   Airam Miranda MD       Family History:     Family History   Problem Relation Age of Onset    Diabetes Mother     Coronary Artery Disease Mother     Stroke Father     Breast Cancer Maternal Aunt 28    Coronary Artery Disease Brother     Pseudocholinesterase Deficiency Neg Hx     Malignant Hyperthermia Neg Hx     Delayed Awakening Neg Hx     Post-op Nausea/Vomiting Neg Hx     Emergence Delirium Neg Hx     Post-op Cognitive Dysfunction Neg Hx     Other Neg Hx        Social History:      Social History     Tobacco Use    Smoking status: Former Smoker     Packs/day: 0.50     Years: 24.00     Pack years: 12.00     Quit date: 2021     Years since quittin.0    Smokeless tobacco: Never Used   Substance Use Topics    Alcohol use: Not Currently         Allergies: Allergies   Allergen Reactions    Naproxen Itching    Ultram [Tramadol] Itching        Vitals: There were no vitals taken for this visit. Objective:         General: No Acute distress                   HEENT: Normocephalic/atramatic                   Lungs:  Breathing non-labored                   Heart:   RRR                    Abdomen: soft       Extremities:  Prior exam done in office has been consistent with end-stage hip arthritis. We have noted pain with ROM of the right hip which manifests as anterior groin pain. There is decreased internal and external rotation of the affected hip. No significant pain with palpation over the greater trochanteric bursa. No radicular pain with straight leg raise. Patient walks with and antalgic gait. Distally patient has no neurologic deficit. Patient Active Problem List   Diagnosis Code    Atrial tachycardia ([facility]) I47.1    Chest pain R07.9    HTN (hypertension) I10    Dyslipidemia E78.5    Diabetes mellitus type 2, diet-controlled ([facility]) E11.9    Tobacco abuse Z72.0    SVT (supraventricular tachycardia) ([facility]) I47.1    Severe obesity ([facility]) E66.01       Assessment:   1. Arthritis of the Right hip    Plan:   The patient has failed previous conservative treatment for this condition.  The patient has pain in the right hip which causes daily symptoms which affects the patient's activities of daily living and quality of life. The risks, benefits, alternatives and possible complications of right total hip arthroplasty have been discussed with the patient including but not limited to infection, bleeding, damage to nerves and blood vessels with particular attention given to risk of damage of the femoral, obturator, lateral femoral cutaneous, superior gluteal, inferior gluteal, and sciatic nerve, risk of dislocation, fracture both intra-op and post-op, limb length inequality, polyethylene wear, implant loosening, risk for continued pain, and risk for revision surgery secondary to but not limited to all of these discussed risks. Further we discussed risk of venous thrombo-embolism including deep vein thrombosis and pulmonary embolism despite being on prophylaxis. We have also previously discussed the potential of morbidity and mortality associated with, but not limited to, the actual surgical procedure, anesthesia, prior medical conditions, and/or the administration of medications perioperatively. I have made no guarantees to the patient regarding outcomes and the patient has voiced understanding of that. The patient has been given the opportunity to ask questions all of which have been answered and the patient wishes to proceed. The patient will be admitted the day of surgery for right total hip replacement. The patient was counseled at length about the risks of adelaida Covid-19 during their perioperative period and any recovery window from their procedure. The patient was made aware that adelaida Covid-19  may worsen their prognosis for recovering from their procedure and lend to a higher morbidity and/or mortality risk. All material risks, benefits, and reasonable alternatives including postponing the procedure were discussed. The patient does  wish to proceed with the procedure at this time.         Signed By: Mary Humphreys MD  October 6, 2021

## 2021-10-06 NOTE — PROGRESS NOTES
Problem: Self Care Deficits Care Plan (Adult)  Goal: *Acute Goals and Plan of Care (Insert Text)  Outcome: Progressing Towards Goal  Note: GOALS:   DISCHARGE GOALS (in preparation for going home/rehab):  3 days  1. Ms. Gama will perform one lower body dressing activity with minimal assistance with adaptive equipment to demonstrate improved functional mobility and safety. 2.  Ms. Gama will perform one lower body bathing activity with minimal  assistance with adaptive equipment to demonstrate improved functional mobility and safety. 3.  Ms. aGma will perform toileting/toilet transfer with contact guard assistance with adaptive equipment to demonstrate improved functional mobility and safety. 4.  Ms. Gama will perform shower transfer with contact guard assistance with adaptive equipment to demonstrate improved functional mobility and safety. 5.  Ms. Gama will state ROBERTA precautions with two verbal cues to demonstrate improved functional mobility and safety. JOINT CAMP OCCUPATIONAL THERAPY ROBERTA: Initial Assessment and Daily Note 10/6/2021  OUTPATIENT: Hospital Day: 1  Payor: ABSOLUTE TOTAL CARE / [place] [employer] / Product Type: Managed Care Medicaid /      NAME/AGE/GENDER: Brea Hayden is a 49 y.o. female   PRIMARY DIAGNOSIS:  Primary osteoarthritis of right hip [M16.11]   Procedure(s) and Anesthesia Type:     * Failed RIGHT HIP ARTHROPLASTY TOTAL / GRACIELA - Spinal (Right)  ICD-10: Treatment Diagnosis:    Pain in left hip (M25.552)      ASSESSMENT:     Ms. Gama is failed right ROBERTA. Unable to dislocate hip during sx. Patient with new deficits due to sx pain. Keep for ADL training to ensure safe return home. Patient understands she did not have a ROBERTA and will schedule a future sx with Dr. Codi Starkey. Initiate OT.      This section established at most recent assessment   PROBLEM LIST (Impairments causing functional limitations):  Decreased Strength  Decreased ADL/Functional Activities  Decreased Transfer Abilities  Increased Pain  Increased Fatigue  Decreased Flexibility/Joint Mobility  Decreased Knowledge of Precautions   INTERVENTIONS PLANNED: (Benefits and precautions of occupational therapy have been discussed with the patient.)  Activities of daily living training  Adaptive equipment training  Balance training  Clothing management  Donning&doffing training  Theraputic activity     TREATMENT PLAN: Frequency/Duration: Follow patient 1tx to address above goals. Rehabilitation Potential For Stated Goals: Good     RECOMMENDED REHABILITATION/EQUIPMENT: (at time of discharge pending progress): Continue Skilled Therapy. OCCUPATIONAL PROFILE AND HISTORY:   History of Present Injury/Illness (Reason for Referral): Pt presents this date s/p (Right) failed ROBERTA. Past Medical History/Comorbidities:   Ms. Yuki Garza  has a past medical history of BMI 39.0-39.9,adult, CAD (coronary artery disease), Chronic pain, COVID-19 vaccine series completed (3/10/2021, 2/10/2021), Diabetes [facility] Diabetes mellitus type 2, diet-controlled ([facility] (1/18/2017), Former smoker, GERD (gastroesophageal reflux disease), High cholesterol, History of anemia, Hypertension, Ill-defined condition, OA (osteoarthritis), Other ill-defined conditions(799.89), Other ill-defined conditions(799.89), Psychiatric disorder, Sleep apnea, Tachycardia, and V tach ([address] She also has no past medical history of Coagulation defects, COPD, Dementia, Gastrointestinal disorder, Infectious disease, Neurological disorder, or Sleep disorder. Ms. Yuki Garza  has a past surgical history that includes hx tubal ligation; pr breast surgery procedure unlisted; hx heart catheterization (01/18/2017); hx svt ablation (11/23/2014); hx cyst removal; hx cholecystectomy; and hx colonoscopy (2021).   Social History/Living Environment:   Home Environment: Private residence  # Steps to Enter: 0  One/Two Story Residence: One story  Living Alone: No  Support Systems: Spouse/Significant Other  Patient Expects to be Discharged to[de-identified] House  Tub or Shower Type: Shower    Prior Level of Function/Work/Activity:  Independent prior. Number of Personal Factors/Comorbidities that affect the Plan of Care: Brief history (0):  LOW COMPLEXITY   ASSESSMENT OF OCCUPATIONAL PERFORMANCE[de-identified]   Most Recent Physical Functioning:   Balance  Sitting: Intact; High guard  Standing: Pull to stand; With support       Gross Assessment  AROM: Within functional limits (left LE)  Strength: Generally decreased, functional (left LE)            Coordination  Fine Motor Skills-Upper: Left Intact; Right Intact  Gross Motor Skills-Upper: Left Intact; Right Intact         Mental Status  Neurologic State: Drowsy  Orientation Level: Oriented X4                Basic ADLs (From Assessment) Complex ADLs (From Assessment)   Basic ADL  Feeding: Independent  Oral Facial Hygiene/Grooming: Setup  Bathing: Minimum assistance  Upper Body Dressing: Setup  Lower Body Dressing: Moderate assistance  Toileting: Moderate assistance     Grooming/Bathing/Dressing Activities of Daily Living                       Functional Transfers  Toilet Transfer : Minimum assistance  Shower Transfer: Moderate assistance     Bed/Mat Mobility  Supine to Sit: Minimum assistance  Sit to Stand: Minimum assistance  Stand to Sit: Contact guard assistance  Bed to Chair: Minimum assistance         Physical Skills Involved:  Range of Motion  Balance  Strength  Activity Tolerance Cognitive Skills Affected (resulting in the inability to perform in a timely and safe manner):  [facility] Psychosocial Skills Affected:  WFL   Number of elements that affect the Plan of Care: 1-3:  LOW COMPLEXITY   CLINICAL DECISION MAKIN [facility] [address] [id number] AM-PAC 6 Clicks   Daily Activity Inpatient Short Form  How much help from another person does the patient currently need. .. Total A Lot A Little None   1.   Putting on and taking off regular lower body clothing? [] 1   [x] 2   [] 3   [] 4   2. Bathing (including washing, rinsing, drying)? [] 1   [x] 2   [] 3   [] 4   3. Toileting, which includes using toilet, bedpan or urinal?   [] 1   [x] 2   [] 3   [] 4   4. Putting on and taking off regular upper body clothing? [] 1   [] 2   [] 3   [x] 4   5. Taking care of personal grooming such as brushing teeth? [] 1   [] 2   [] 3   [x] 4   6. Eating meals? [] 1   [] 2   [] 3   [x] 4   © 2007, Trustees of [facility] [employer], under license to [employer]. All rights reserved     Score:  Initial: 18 Most Recent: X (Date: -- )    Interpretation of Tool:  Represents activities that are increasingly more difficult (i.e. Bed mobility, Transfers, Gait). Medical Necessity:     Skilled intervention continues to be required due to Deficits ntoed abvoe. Reason for Services/Other Comments:  Patient continues to require skilled intervention due to   Hip dx  . Use of outcome tool(s) and clinical judgement create a POC that gives a: MODERATE COMPLEXITY            TREATMENT:   (In addition to Assessment/Re-Assessment sessions the following treatments were rendered)     Pre-treatment Symptoms/Complaints:    Pain: Initial:   Pain Intensity 1: 3  Pain Location 1: Hip  Pain Orientation 1: Right  Post Session:  3     Assessment/Reassessment only, no treatment provided today    Treatment/Session Assessment:     Response to Treatment:  Fair, high pain.  .    Education:  [] Home Exercises  [x] Fall Precautions  [x] Hip Precautions [] Going Home Video  [] Knee/Hip Prosthesis Review  [x] Walker Management/Safety [x] Adaptive Equipment as Needed       Interdisciplinary Collaboration:   Physical Therapist  Occupational Therapist  Registered Nurse    After treatment position/precautions:   Up in chair  Bed/Chair-wheels locked  Caregiver at bedside  Call light within reach  RN notified     Compliance with Program/Exercises: Compliant all of the time, Will assess as treatment progresses. Recommendations/Intent for next treatment session:  Treatment next visit will focus on increasing Ms. Church's independence with bed mobility, transfers, self care, functional mobility, modalities for pain, and patient education.       Total Treatment Duration:  OT Patient Time In/Time Out  Time In: 1540  Time Out: [address] [facility],

## 2021-10-06 NOTE — OP NOTES
Total Hip Procedure Note    Colt Foster,  [id number],  1970    Pre-operative Diagnosis: Primary osteoarthritis of right hip [M16.11]    Post-operative Diagnosis: Same    Procedure: Aborted Right Total Hip Arthroplasty    BMI: There is no height or weight on file to calculate BMI. .    Location: [address] [place] [address]    Surgeon: Derotha Primrose, MD    Assistant: Keegan Garcia CFA and Maya Alvarez NP CFA    Anesthesia: Spinal     Complications: None    EBL: 50cc    Drains: None    Intra-op Findings: After taking down the external rotators and posterior capsule we attempted to dislocate the hip. We noted that the femoral head was unable to be delivered into the wound. We attempted to further deliver the femoral head into the wound with use of a bone hook which was unsuccessful. The depth of the wound was noted to be a confounding factor in our difficulty in visualizing the proximal femur. Given this I felt that we would be unable to adequately visualize the proximal femur for broaching later in the case and potentially not be able to prepare the femur properly or at all. In light of this the decision was made to abort the hip replacement. Patient condition at completion of Procedure: Stable    Implants:   Implant Name Type Inv. Item Serial No.  Lot No. LRB No. Used Action   PIN FIX TROCAR PT 1 END 9/64X9 IN 1 PT STYL SMOOTH PLN STRL - [id number]  PIN FIX TROCAR PT 1 END 9/64X9 IN 1 PT STYL SMOOTH PLN STRL  [facility]_WD [id number] Right 2 Implanted and Explanted       Description of Procedure    The complexity of the total joint surgery requires the use of a first assistant for positioning, retraction and assistance in closure. Colt Foster was brought to the operating room. Patient was transferred from the stretcher to OR bed. Anesthesia was induced. IV antibiotics were administered per protocol.  Colt Foster was positioned in the lateral decubitus position and the pelvis/torso stabilized with posts. The right leg was prepped and draped in the usual sterile fashion. A time out identifying the patient, procedure, operative side and surgeon was administered and charted by the OR Nurse. Prior to incision one gram of TXA was given intravenously. A standard posterior approach was utilized to expose the right hip. The incision was carried through the subcutaneous tissue and underlying fascia with hemostasis obtained using the bovie cauterization and Aquamantys. A Charmley retractor was inserted. We resected any redundent bursal tissue off the external rotators. We were able to identify the piriformis tendon. The short external rotators and capsule were dissected off the posterior femur as a single layer just superior to the piriformis tendon. The sciatic nerve was palpated and protected during dissection. After taking down the external rotators and posterior capsule we attempted to dislocate the hip. We noted that the femoral head was unable to be delivered into the wound. The femoral head would dislocate from the acetabulum but would not deliver up and into the wound We attempted to further deliver the femoral head into the wound with use of a bone hook which was unsuccessful even with a large amount of force. The depth of the wound was noted to be a confounding factor in our difficulty in visualizing the proximal femur. This raised the concern that even with a femoral elevator that the proximal femur wound not deliver later in the case for broaching Given this I felt that we would be unable to adequately visualize the proximal femur for broaching later in the case and potentially not be able to prepare the femur properly or at all. In light of this the decision was made to abort the hip replacement. The wound was irrigated with 3 liters of saline along with Irrisept irrigation after we had performed a betadine soak for 3 minutes.     Prior to wound closure one additional gram of TXA was given for a total of 2 grams. The capsule was repaired with a three #2 Fiberwires secured through drill holes placed in the posterior aspect of the trochanter. No drain was placed. The fascia was closed with a #0 Bidirectional Stratafix barbed suture. The sub-Q layer was closed with 2-0 monocril suture and a  3-0 moncril stratafix suture in a running subcuticular fashion was used to close the skin. The incision site was thoroughly cleaned with alcohol and the Exofin wound closure system was applied to seal it off from external contamination after the overlying skin was thoroughly cleaned with alcohol. A thin layer of [place] lubricant was applied over the wound to keep the dressing from adhering to the overlying sterile bandage and said bandage was placed. Drapes were then broken down and patient was transferred carefully back to the OR stretcher. The sponge and needle counts were correct. The patient tolerated the procedure without difficulty and left the operating room in satisfactory condition.     Signed By: Luke Avelar MD

## 2021-10-06 NOTE — PERIOP NOTES
TRANSFER - OUT REPORT:    Verbal report given to Sai Castillo (name) on Lily Bailey being transferred to [facility](unit) for routine progression of care       Report consisted of patient's Situation, Background, Assessment and   Recommendations(SBAR). Information from the following report(s) OR Summary, Procedure Summary, Intake/Output and MAR was reviewed with the receiving nurse. Opportunity for questions and clarification was provided.       Patient transported with:   O2 @ 2 liters  Tech

## 2021-10-06 NOTE — ANESTHESIA PREPROCEDURE EVALUATION
Relevant Problems   CARDIOVASCULAR   (+) Atrial tachycardia (HCC)   (+) HTN (hypertension)   (+) SVT (supraventricular tachycardia) (HCC)      ENDOCRINE   (+) Severe obesity (HCC)       Anesthetic History   No history of anesthetic complications            Review of Systems / Medical History  Pertinent labs reviewed    Pulmonary        Sleep apnea: CPAP  Smoker         Neuro/Psych   Within defined limits          Comments: Chronic LBP Cardiovascular    Hypertension        Dysrhythmias (atrial tachycardia s/p ablation 2014)       Exercise tolerance: <4 METS: At about 4 METs per pt. Comments: Normal cath 1/17. GI/Hepatic/Renal     GERD: well controlled           Endo/Other    Diabetes: type 2    Morbid obesity, arthritis and anemia     Other Findings            Physical Exam    Airway  Mallampati: III  TM Distance: 4 - 6 cm  Neck ROM: normal range of motion   Mouth opening: Normal     Cardiovascular  Regular rate and rhythm,  S1 and S2 normal,  no murmur, click, rub, or gallop            Comments: distant Dental  No notable dental hx       Pulmonary  Breath sounds clear to auscultation              Comments: distant Abdominal  GI exam deferred       Other Findings            Anesthetic Plan    ASA: 3  Anesthesia type: general          Induction: Intravenous  Anesthetic plan and risks discussed with: Patient and Spouse      Glidescope in room.

## 2021-10-06 NOTE — PROGRESS NOTES
Problem: Mobility Impaired (Adult and Pediatric)  Goal: *Acute Goals and Plan of Care (Insert Text)  Outcome: Progressing Towards Goal  Note: GOALS (1-4 days):  (1.)Ms. Fernando Martinez will move from supine to sit and sit to supine  in bed with STAND BY ASSIST.    (2.)Ms. Fernando Martinez will transfer from bed to chair and chair to bed with STAND BY ASSIST using the least restrictive device. (3.)Ms. Fernando Martinez will ambulate with STAND BY ASSIST for 100 feet with the least restrictive device. (4.)Ms. Fernando Martinez will ambulate up/down 2 steps with bilateral  railing with MINIMAL ASSIST with no device. ________________________________________________________________________________________________       PHYSICAL THERAPY JOINT CAMP CHANNING: Initial Assessment and PM 10/6/2021  OUTPATIENT: Hospital Day: 1  Payor: [employer]OL[employer] T[employer] / [place] [employer] [place] / Product Type: Managed Care Medicaid /      NAME/AGE/GENDER: Shaniqua Martinez is a 49 y.o. female   PRIMARY DIAGNOSIS:  Primary osteoarthritis of right hip [M16.11]   Procedure(s) and Anesthesia Type:     * RIGHT HIP ARTHROPLASTY TOTAL / GRACIELA - Spinal (Right)  ICD-10: Treatment Diagnosis:    Pain in Right Hip (M25.551)  Stiffness of Right Hip, Not elsewhere classified (M25.651)  Difficulty in walking, Not elsewhere classified (R26.2)      ASSESSMENT:     Ms. Fernando Martinez presents with decreased strength and range of motion right lower extremity with decreased independence with functional mobility s/p aborted right channing. Right channing was unable to completed today. She was in a lot of pain this afternoon but agreed to get to the chair with RW and assistance. She felt better in the chair. Ice pack applied. Will see tomorrow to increase functional mobility before going home.  present.     This section established at most recent assessment   PROBLEM LIST (Impairments causing functional limitations):  Decreased Strength  Decreased ADL/Functional Activities  Decreased Transfer Abilities  Decreased Ambulation Ability/Technique  Increased Pain  Decreased Flexibility/Joint Mobility  Decreased Roosevelt with Home Exercise Program   INTERVENTIONS PLANNED: (Benefits and precautions of physical therapy have been discussed with the patient.)  Bed Mobility  Cold  Gait Training  Home Exercise Program (HEP)  Range of Motion (ROM)  Therapeutic Activites  Therapeutic Exercise/Strengthening  Transfer Training     TREATMENT PLAN: Frequency/Duration: Follow patient BID for duration of hospital stay to address above goals. Rehabilitation Potential For Stated Goals: Good     RECOMMENDED REHABILITATION/EQUIPMENT: (at time of discharge pending progress): Continue Skilled Therapy and Home Health: Physical Therapy. HISTORY:   History of Present Injury/Illness (Reason for Referral):  Pt s/p ROBERTA on 10/6  Past Medical History/Comorbidities:   Ms. Madiha Mcnulty  has a past medical history of BMI 39.0-39.9,adult, CAD (coronary artery disease), Chronic pain, COVID-19 vaccine series completed (3/10/2021, 2/10/2021), Diabetes ([address] Diabetes mellitus type 2, diet-controlled ([address] (1/18/2017), Former smoker, GERD (gastroesophageal reflux disease), High cholesterol, History of anemia, Hypertension, Ill-defined condition, OA (osteoarthritis), Other ill-defined conditions(799.89), Other ill-defined conditions(799.89), Psychiatric disorder, Sleep apnea, Tachycardia, and V tach ([address] She also has no past medical history of Coagulation defects, COPD, Dementia, Gastrointestinal disorder, Infectious disease, Neurological disorder, or Sleep disorder. Ms. Madiha Mcnulty  has a past surgical history that includes hx tubal ligation; pr breast surgery procedure unlisted; hx heart catheterization (01/18/2017); hx svt ablation (11/23/2014); hx cyst removal; hx cholecystectomy; and hx colonoscopy (2021).    Social History/Living Environment:   Home Environment: Private residence  # Steps to Enter: 0  One/Two Story Residence: One story  Living Alone: No  Support Systems: Spouse/Significant Other  Patient Expects to be Discharged to[de-identified] House  Tub or Shower Type: Shower    Prior Level of Function/Work/Activity:  independent   Number of Personal Factors/Comorbidities that affect the Plan of Care: 1-2: MODERATE COMPLEXITY   EXAMINATION:   Most Recent Physical Functioning:      Gross Assessment  AROM: Within functional limits (left LE)  Strength: Generally decreased, functional (left LE)                     Bed Mobility  Supine to Sit: Minimum assistance    Transfers  Sit to Stand: Minimum assistance  Stand to Sit: Contact guard assistance  Bed to Chair: Minimum assistance    Balance  Sitting: Intact; High guard  Standing: Pull to stand; With support              Weight Bearing Status  Right Side Weight Bearing: As tolerated  Distance (ft): 3 Feet (ft)  Ambulation - Level of Assistance: Contact guard assistance  Assistive Device: Walker, rolling  Speed/Susana: Delayed  Step Length: Left shortened;Right shortened  Stance: Right decreased  Gait Abnormalities: Antalgic;Decreased step clearance  Interventions: Safety awareness training;Verbal cues     Braces/Orthotics:     Right Hip Cold  Type: Cold/ice packs      Body Structures Involved:  Joints  Muscles Body Functions Affected: Movement Related Activities and Participation Affected: Mobility  Self Care   Number of elements that affect the Plan of Care: 4+: HIGH COMPLEXITY   CLINICAL PRESENTATION:   Presentation: Stable and uncomplicated: LOW COMPLEXITY   CLINICAL DECISION MAKIN[address] [place] [address] [address] [address] AM-PAC 6 Clicks   Basic Mobility Inpatient Short Form  How much difficulty does the patient currently have. .. Unable A Lot A Little None   1. Turning over in bed (including adjusting bedclothes, sheets and blankets)? [] 1   [] 2   [x] 3   [] 4   2.   Sitting down on and standing up from a chair with arms ( e.g., wheelchair, bedside commode, etc.)   [] 1   [] 2   [x] 3   [] 4   3. Moving from lying on back to sitting on the side of the bed? [] 1   [] 2   [x] 3   [] 4   How much help from another person does the patient currently need. .. Total A Lot A Little None   4. Moving to and from a bed to a chair (including a wheelchair)? [] 1   [] 2   [x] 3   [] 4   5. Need to walk in hospital room? [] 1   [x] 2   [] 3   [] 4   6. Climbing 3-5 steps with a railing? [] 1   [x] 2   [] 3   [] 4   © 2007, Trustees of [address] [place] [address] [address] [address], under license to [employer]. All rights reserved     Score:  Initial: 16 Most Recent: X (Date: -- )    Interpretation of Tool:  Represents activities that are increasingly more difficult (i.e. Bed mobility, Transfers, Gait). Medical Necessity:     Patient is expected to demonstrate progress in   strength, range of motion, and functional technique   to   decrease assistance required with functional mobility and ROBERTA management  . Reason for Services/Other Comments:  Patient continues to require skilled intervention due to   Inability to complete functional mobility and ROBERTA management independently  . Use of outcome tool(s) and clinical judgement create a POC that gives a: Clear prediction of patient's progress: LOW COMPLEXITY            TREATMENT:   (In addition to Assessment/Re-Assessment sessions the following treatments were rendered)     Pre-treatment Symptoms/Complaints:  hip pain  Pain Initial:      Post Session:  did not rate     Assessment/Reassessment only, no treatment provided today    Date:   Date:   Date:     ACTIVITY/EXERCISE AM PM AM PM AM PM     []  []  []  []  []  []   Ankle Pumps         Quad Sets         Gluteal Sets         Hip ABd/ADduction         Knee Slides         Short Arc Quads         Long Arc Quads                                    B = bilateral; AA = active assistive; A = active; P = passive      Treatment/Session Assessment:     Response to Treatment:  did ok, pain limiting mobility.     Education:  [x] Home Exercises  [x] Fall Precautions  [x] Hip Precautions [] D/C Instruction Review  [x] Hip Prosthesis Review  [x] Walker Management/Safety [] Adaptive Equipment as Needed       Interdisciplinary Collaboration:   Occupational Therapist  Registered Nurse    After treatment position/precautions:   Up in chair  Bed/Chair-wheels locked  Bed in low position  Caregiver at bedside  Call light within reach  RN notified  Family at bedside    Compliance with Program/Exercises: Will assess as treatment progresses. Recommendations/Intent for next treatment session:  Treatment next visit will focus on increasing Ms. Church's independence with bed mobility, transfers, gait training, strength/ROM exercises, modalities for pain, and patient education.       Total Treatment Duration:  PT Patient Time In/Time Out  Time In: 1550  Time Out: [address] PT

## 2021-10-06 NOTE — PROGRESS NOTES
Care Management Interventions  PCP Verified by CM: Yes  Transition of Care Consult (CM Consult): [address]: Yes  Physical Therapy Consult: Yes  Support Systems: Spouse/Significant Other  Confirm Follow Up Transport: Family  The Plan for Transition of Care is Related to the Following Treatment Goals : Return Home  Discharge Location  Discharge Placement: Home with home health ([address] [place] [address])    Patient is a 49y.o. year old female admitted for Right ROBERTA, pt did not have her scheduled surgery today, nursing reporting surgery is incomplete. Patient plans to return home on discharge. Order received to arrange home health. Patient without preference towards agency. [facility] Patient needs a walker and bedside commode. Patient requesting we arrange a bedside commode and walker. Referral sent to [address] [place] [address]. delivered to the hospital room prior to discharge. Will follow until discharge.      LILLY Jay

## 2021-10-06 NOTE — PROGRESS NOTES
10/06/21 1511   Oxygen Therapy   O2 Sat (%) 95 %   Pulse via Oximetry 82 beats per minute   O2 Device Nasal cannula   O2 Flow Rate (L/min) 3 l/min   Good npc. Pt working on IS. Pt encouraged to do 10 breaths per hour while awake on IS. No respiratory distress noted at this time. Pt. Placed on RA and pt. Has home CPAP at bedside.

## 2021-10-06 NOTE — PROGRESS NOTES
TRANSFER - IN REPORT:    Verbal report received from Oscar Ferrer RN on Shannan Kimble  being received from PACU for routine post - op      Report consisted of patients Situation, Background, Assessment and   Recommendations(SBAR). Information from the following report(s) SBAR was reviewed with the receiving nurse. Opportunity for questions and clarification was provided. Assessment completed upon patients arrival to unit and care assumed. R hip with ABDs and tape dry and intact. SCDs to LEs. Drowsy. Good movement and sensation to LEs. Assisted to turn and reposition in bed.  at bedside.

## 2021-10-06 NOTE — ANESTHESIA POSTPROCEDURE EVALUATION
Procedure(s):  RIGHT HIP ARTHROPLASTY TOTAL / GRACIELA. general    Anesthesia Post Evaluation        Patient location during evaluation: PACU  Patient participation: complete - patient participated  Level of consciousness: awake  Pain management: satisfactory to patient  Airway patency: patent  Anesthetic complications: no  Cardiovascular status: hemodynamically stable  Respiratory status: spontaneous ventilation  Hydration status: euvolemic  Post anesthesia nausea and vomiting:  none      INITIAL Post-op Vital signs:   Vitals Value Taken Time   /56 10/06/21 1430   Temp 36.9 °C (98.5 °F) 10/06/21 1338   Pulse 63 10/06/21 1432   Resp 16 10/06/21 1423   SpO2 100 % 10/06/21 1432   Vitals shown include unvalidated device data.

## 2021-10-07 VITALS
TEMPERATURE: 98.4 F | RESPIRATION RATE: 15 BRPM | SYSTOLIC BLOOD PRESSURE: 114 MMHG | DIASTOLIC BLOOD PRESSURE: 51 MMHG | HEART RATE: 64 BPM | OXYGEN SATURATION: 97 %

## 2021-10-07 LAB
GLUCOSE BLD STRIP.AUTO-MCNC: 146 MG/DL (ref 65–100)
HGB BLD-MCNC: 11.1 G/DL (ref 11.7–15.4)
SERVICE CMNT-IMP: ABNORMAL

## 2021-10-07 PROCEDURE — 85018 HEMOGLOBIN: CPT

## 2021-10-07 PROCEDURE — 36415 COLL VENOUS BLD VENIPUNCTURE: CPT

## 2021-10-07 PROCEDURE — 97535 SELF CARE MNGMENT TRAINING: CPT

## 2021-10-07 PROCEDURE — 74011250636 HC RX REV CODE- 250/636: Performed by: ORTHOPAEDIC SURGERY

## 2021-10-07 PROCEDURE — 74011000250 HC RX REV CODE- 250: Performed by: ORTHOPAEDIC SURGERY

## 2021-10-07 PROCEDURE — 97116 GAIT TRAINING THERAPY: CPT

## 2021-10-07 PROCEDURE — 74011250636 HC RX REV CODE- 250/636: Performed by: NURSE PRACTITIONER

## 2021-10-07 PROCEDURE — 99024 POSTOP FOLLOW-UP VISIT: CPT | Performed by: NURSE PRACTITIONER

## 2021-10-07 PROCEDURE — 74011250637 HC RX REV CODE- 250/637: Performed by: NURSE PRACTITIONER

## 2021-10-07 PROCEDURE — 82962 GLUCOSE BLOOD TEST: CPT

## 2021-10-07 PROCEDURE — 97110 THERAPEUTIC EXERCISES: CPT

## 2021-10-07 RX ADMIN — KETOROLAC TROMETHAMINE 15 MG: 15 INJECTION, SOLUTION INTRAMUSCULAR; INTRAVENOUS at 05:42

## 2021-10-07 RX ADMIN — DOCUSATE SODIUM 50MG AND SENNOSIDES 8.6MG 2 TABLET: 8.6; 5 TABLET, FILM COATED ORAL at 10:44

## 2021-10-07 RX ADMIN — METOPROLOL SUCCINATE 50 MG: 50 TABLET, EXTENDED RELEASE ORAL at 10:44

## 2021-10-07 RX ADMIN — GABAPENTIN 300 MG: 300 CAPSULE ORAL at 00:17

## 2021-10-07 RX ADMIN — KETOROLAC TROMETHAMINE 15 MG: 15 INJECTION, SOLUTION INTRAMUSCULAR; INTRAVENOUS at 00:17

## 2021-10-07 RX ADMIN — Medication 1 AMPULE: at 10:44

## 2021-10-07 RX ADMIN — CEFAZOLIN SODIUM 2 G: 100 INJECTION, POWDER, LYOPHILIZED, FOR SOLUTION INTRAVENOUS at 05:42

## 2021-10-07 RX ADMIN — HYDROMORPHONE HYDROCHLORIDE 2 MG: 2 TABLET ORAL at 00:17

## 2021-10-07 RX ADMIN — PANTOPRAZOLE SODIUM 40 MG: 40 TABLET, DELAYED RELEASE ORAL at 05:43

## 2021-10-07 RX ADMIN — HYDROMORPHONE HYDROCHLORIDE 2 MG: 2 TABLET ORAL at 10:44

## 2021-10-07 RX ADMIN — HYDROMORPHONE HYDROCHLORIDE 2 MG: 2 TABLET ORAL at 05:42

## 2021-10-07 RX ADMIN — Medication 10 ML: at 05:50

## 2021-10-07 RX ADMIN — HYDROCHLOROTHIAZIDE 25 MG: 25 TABLET ORAL at 10:44

## 2021-10-07 RX ADMIN — MONTELUKAST 10 MG: 10 TABLET, FILM COATED ORAL at 00:17

## 2021-10-07 RX ADMIN — ACETAMINOPHEN 1000 MG: 500 TABLET, FILM COATED ORAL at 05:42

## 2021-10-07 RX ADMIN — Medication 81 MG: at 10:44

## 2021-10-07 RX ADMIN — Medication 10 ML: at 00:17

## 2021-10-07 RX ADMIN — ACETAMINOPHEN 1000 MG: 500 TABLET, FILM COATED ORAL at 00:17

## 2021-10-07 RX ADMIN — VENLAFAXINE HYDROCHLORIDE 150 MG: 150 CAPSULE, EXTENDED RELEASE ORAL at 10:44

## 2021-10-07 NOTE — DISCHARGE INSTRUCTIONS
[facility]   Patient Discharge Instructions     Palm / [id number] : 1970    Admitted 10/6/2021 Discharged: 10/7/2021     IF YOU HAVE ANY PROBLEMS ONCE YOU ARE AT HOME CALL THE FOLLOWING NUMBERS:   Main office number: [phone]    Take Home Medications     · It is important that you take the medication exactly as they are prescribed. · Keep your medication in the bottles provided by the pharmacist and keep a list of the medication names, dosages, and times to be taken in your wallet. · Do not take other medications without consulting your doctor. What to do at [address] your prehospital diet. If you have excessive nausea or vomitting call your doctor's office     Home Physical Therapy is arranged. Use rolling walker when walking. Patients who have had a joint replacement should not drive until you are seen for your follow up appointment by Dr. Elma Spence. When to Call    - Call if you have a temperature greater then 101  - Unable to keep food down  - Loose control of your bladder or bowel function  - Are unable to bear any weight   - Need a pain medication refill     Patient Education          Information obtained by :  I understand that if any problems occur once I am at home I am to contact my physician. I understand and acknowledge receipt of the instructions indicated above.                                                                                                                                            Physician's or R.N.'s Signature                                                                  Date/Time                                                                                                                                              Patient or Representative Signature                                                          Date/Time

## 2021-10-07 NOTE — PROGRESS NOTES
Care Management Interventions  PCP Verified by CM:  Yes  Transition of Care Consult (CM Consult): [address]: Yes  Physical Therapy Consult: Yes  Support Systems: Spouse/Significant Other  Confirm Follow Up Transport: Family  The Plan for Transition of Care is Related to the Following Treatment Goals : Return Home  Discharge Location  Discharge Placement: [facility] with [facility] ([address] [place] [address])    Per NP., pt does not need HH due to not having the scheduled surgery    LILLY Ramirez

## 2021-10-07 NOTE — PROGRESS NOTES
Problem: Self Care Deficits Care Plan (Adult)  Goal: *Acute Goals and Plan of Care (Insert Text)  Outcome: Progressing Towards Goal  Note: GOALS:   DISCHARGE GOALS (in preparation for going home/rehab):  3 days all goals met 10/7/2021  1. Ms. Lorenzo Elmore will perform one lower body dressing activity with minimal assistance with adaptive equipment to demonstrate improved functional mobility and safety. 2.  Ms. Lorenzo lEmore will perform one lower body bathing activity with minimal  assistance with adaptive equipment to demonstrate improved functional mobility and safety. 3.  Ms. Lorenzo Elmore will perform toileting/toilet transfer with contact guard assistance with adaptive equipment to demonstrate improved functional mobility and safety. 4.  Ms. Lorenzo Elmore will perform shower transfer with contact guard assistance with adaptive equipment to demonstrate improved functional mobility and safety. JOINT CAMP OCCUPATIONAL THERAPY ROBERTA: Daily Note, Discharge and AM 10/7/2021  OUTPATIENT: Hospital Day: 2  Payor: ABSOLUTE TOTAL CARE / [place] / Product Type: Managed Care Medicaid /      NAME/AGE/GENDER: Tristin Dueñas is a 49 y.o. female   PRIMARY DIAGNOSIS:  Primary osteoarthritis of right hip [M16.11]   Procedure(s) and Anesthesia Type:     * Failed RIGHT HIP ARTHROPLASTY TOTAL / Андрей Baig - Spinal (Right)  ICD-10: Treatment Diagnosis:    · Pain in left hip (M25.552)      ASSESSMENT:     Ms. Lorenzo Elmore is failed right ROBERTA. Unable to dislocate hip during sx. Patient with new deficits due to sx pain. Keep for ADL training to ensure safe return home. Patient understands she did not have a ROBERTA and will schedule a future sx with Dr. Vinh Trevizo. Initiate OT. 10-7-21 Ms. Lorenzo Elmore is s/p aborted R hip surgery and presents with decreased weight bearing on R LE and decreased independence with functional mobility and activities of daily living.   Patient completed shower and dressing as charted below in ADL grid and is ambulating with rolling walker and contact guard assist.  Patient has met 4/4 goals and plans to return home with good family support. Family able to provide patient with appropriate level of assistance at this time. OT reviewed hip precautions throughout session. Patient instructed to call for assistance when needing to get up from recliner and all needs in reach. Patient verbalized understanding of call light. This section established at most recent assessment   PROBLEM LIST (Impairments causing functional limitations):  1. Decreased Strength  2. Decreased ADL/Functional Activities  3. Decreased Transfer Abilities  4. Increased Pain  5. Increased Fatigue  6. Decreased Flexibility/Joint Mobility  7. Decreased Knowledge of Precautions   INTERVENTIONS PLANNED: (Benefits and precautions of occupational therapy have been discussed with the patient.)  1. Activities of daily living training  2. Adaptive equipment training  3. Balance training  4. Clothing management  5. Donning&doffing training  6. Theraputic activity     TREATMENT PLAN: Frequency/Duration: Follow patient 1tx to address above goals. Rehabilitation Potential For Stated Goals: Good     RECOMMENDED REHABILITATION/EQUIPMENT: (at time of discharge pending progress): Continue Skilled Therapy. OCCUPATIONAL PROFILE AND HISTORY:   History of Present Injury/Illness (Reason for Referral): Pt presents this date s/p (Right) failed ROBERTA.     Past Medical History/Comorbidities:   Ms. Mauri Buchanan  has a past medical history of BMI 39.0-39.9,adult, CAD (coronary artery disease), Chronic pain, COVID-19 vaccine series completed (3/10/2021, 2/10/2021), Diabetes ([facility] [address] Diabetes mellitus type 2, diet-controlled ([facility] [address] (1/18/2017), Former smoker, GERD (gastroesophageal reflux disease), High cholesterol, History of anemia, Hypertension, Ill-defined condition, OA (osteoarthritis), Other ill-defined conditions(799.89), Other ill-defined conditions(799.89), Psychiatric disorder, Sleep apnea, Tachycardia, and V tach ([address] She also has no past medical history of Coagulation defects, COPD, Dementia, Gastrointestinal disorder, Infectious disease, Neurological disorder, or Sleep disorder. Ms. Kevyn Ritchie  has a past surgical history that includes hx tubal ligation; pr breast surgery procedure unlisted; hx heart catheterization (01/18/2017); hx svt ablation (11/23/2014); hx cyst removal; hx cholecystectomy; and hx colonoscopy (2021). Social History/Living Environment:   Home Environment: Private residence  # Steps to Enter: 0  One/Two Story Residence: One story  Living Alone: No  Support Systems: Spouse/Significant Other  Patient Expects to be Discharged to[de-identified] House  Tub or Shower Type: Shower    Prior Level of Function/Work/Activity:  Independent prior. Number of Personal Factors/Comorbidities that affect the Plan of Care: Brief history (0):  LOW COMPLEXITY   ASSESSMENT OF OCCUPATIONAL PERFORMANCE[de-identified]   Most Recent Physical Functioning:   Balance  Sitting: Intact  Standing: With support                              Mental Status  Neurologic State: Alert  Orientation Level: Oriented X4  Cognition: Follows commands  Perception: Appears intact  Perseveration: No perseveration noted  Safety/Judgement: Fall prevention          RLE Assessment  RLE Assessment (WDL): Exceptions to WDL  RLE AROM  R Hip Flexion: 80  R Hip ABduction: 10  R Knee Extension: 0     Basic ADLs (From Assessment) Complex ADLs (From Assessment)   Basic ADL  Feeding: Independent  Oral Facial Hygiene/Grooming: Setup  Bathing: Minimum assistance  Type of Bath: Chlorhexidine (CHG), Full, Shower  Upper Body Dressing: Setup  Lower Body Dressing: Moderate assistance  Toileting:  Moderate assistance     Grooming/Bathing/Dressing Activities of Daily Living   Grooming  Grooming Assistance: Set-up  Position Performed: Standing  Brushing Teeth: Set-up Cognitive Retraining  Safety/Judgement: Fall prevention   Upper Body Bathing  Bathing Assistance: Set-up  Position Performed: Seated in chair;Standing  Cues: Verbal cues provided  Adaptive Equipment: Grab bar; Shower chair Feeding  Feeding Assistance: Set-up   Lower Body Bathing  Bathing Assistance: Set-up  Perineal  : Set-up  Position Performed: Seated in chair  Cues: Verbal cues provided  Adaptive Equipment: Grab bar  Lower Body : Set-up  Position Performed: Seated in chair  Cues: Verbal cues provided  Adaptive Equipment: Grab bar; Shower chair Toileting  Toileting Assistance: Set-up  Bladder Hygiene: Set-up   Upper Body Dressing Assistance  Dressing Assistance: Set-up  Pullover Shirt: Set-up Functional Transfers  Bathroom Mobility: Contact guard assistance  Toilet Transfer : Contact guard assistance  Shower Transfer: Contact guard assistance  Adaptive Equipment: Bedside commode;Grab bars; Shower chair with back; Walker (comment)   Lower Body Dressing Assistance  Dressing Assistance: Minimum assistance  Underpants: Minimum assistance  Socks: Compensatory technique training ( socks)  Adaptive Equipment Used: Walker;Grab bar Bed/Mat Mobility  Supine to Sit: Stand-by assistance  Sit to Stand: Contact guard assistance  Stand to Sit: Contact guard assistance  Bed to Chair: Contact guard assistance  Scooting: Contact guard assistance  Cues: Verbal cues provided         Physical Skills Involved:  1. Range of Motion  2. Balance  3. Strength  4. Activity Tolerance Cognitive Skills Affected (resulting in the inability to perform in a timely and safe manner):  1. [facility] Psychosocial Skills Affected:  1. WFL   Number of elements that affect the Plan of Care: 1-3:  LOW COMPLEXITY   CLINICAL DECISION MAKIN \[facility] [place] [facility]\"" [address] [address] AM-PAC 6 Clicks   Daily Activity Inpatient Short Form  How much help from another person does the patient currently need. .. Total A Lot A Little None   1. Putting on and taking off regular lower body clothing? [] 1   [] 2   [x] 3   [] 4   2. Bathing (including washing, rinsing, drying)? [] 1   [] 2   [] 3   [x] 4   3. Toileting, which includes using toilet, bedpan or urinal?   [] 1   [] 2   [] 3   [x] 4   4. Putting on and taking off regular upper body clothing? [] 1   [] 2   [] 3   [x] 4   5. Taking care of personal grooming such as brushing teeth? [] 1   [] 2   [] 3   [x] 4   6. Eating meals? [] 1   [] 2   [] 3   [x] 4   © 2007, Trustees of [facility] [employer], under license to [employer]. All rights reserved     Score:  Initial: 18 Most Recent: 23 (Date: 10-7-21 )    Interpretation of Tool:  Represents activities that are increasingly more difficult (i.e. Bed mobility, Transfers, Gait). Medical Necessity:     · Skilled intervention continues to be required due to Deficits ntoed abvoe. Reason for Services/Other Comments:  · Patient continues to require skilled intervention due to   · Hip dx  · . Use of outcome tool(s) and clinical judgement create a POC that gives a: MODERATE COMPLEXITY            TREATMENT:   (In addition to Assessment/Re-Assessment sessions the following treatments were rendered)     Pre-treatment Symptoms/Complaints: Tolerated shower  Pain: Initial:   Pain Intensity 1: 0  Post Session:  0     Self Care: (40): Procedure(s) (per grid) utilized to improve and/or restore self-care/home management as related to dressing, bathing, toileting and grooming. Required minimal verbal and   cueing to facilitate activities of daily living skills and compensatory activities. Treatment/Session Assessment:     Response to Treatment:  Good sitting in chair.     Education:  [] Home Exercises  [x] Fall Precautions  [x] Hip Precautions [] Going Home Video  [] Knee/Hip Prosthesis Review  [x] Walker Management/Safety [x] Adaptive Equipment as Needed       Interdisciplinary Collaboration:   o Occupational Therapist  o Registered Nurse    After treatment position/precautions:   o Up in chair  o Bed/Chair-wheels locked  o Call light within reach  o RN notified  o LEs reclined     Compliance with Program/Exercises: Compliant all of the time. Recommendations/Intent for next treatment session:  Pt doing well all goals met and will do well at home with support from family. Patient will be discharged home with home health PT. No further Occupational Therapy warranted, will discharge Occupational Therapy services.       Total Treatment Duration:  OT Patient Time In/Time Out  Time In: 1030  Time Out: [address] [facility] [address]

## 2021-10-07 NOTE — PROGRESS NOTES
Problem: Mobility Impaired (Adult and Pediatric)  Goal: *Acute Goals and Plan of Care (Insert Text)  Outcome: Progressing Towards Goal  Note: GOALS (1-4 days):  (1.)Ms. Maria A Jeffries will move from supine to sit and sit to supine  in bed with STAND BY ASSIST.    (2.)Ms. Maria A Jeffries will transfer from bed to chair and chair to bed with STAND BY ASSIST using the least restrictive device. (3.)Ms. Maria A Jeffries will ambulate with STAND BY ASSIST for 100 feet with the least restrictive device. (4.)Ms. Maria A Jeffries will ambulate up/down 2 steps with bilateral  railing with MINIMAL ASSIST with no device. Goals met except 4, has no steps    ________________________________________________________________________________________________       PHYSICAL THERAPY JOINT CAMP ROBERTA: Daily Note and AM 10/7/2021  OUTPATIENT: Hospital Day: 2  Payor: ABSOLUTE TOTAL CARE / [place] / Product Type: Managed Care Medicaid /      NAME/AGE/GENDER: Tanvi Cerda is a 49 y.o. female   PRIMARY DIAGNOSIS:  Primary osteoarthritis of right hip [M16.11]   Procedure(s) and Anesthesia Type:     * RIGHT HIP ARTHROPLASTY TOTAL / GRACIELA - Spinal (Right)  ICD-10: Treatment Diagnosis:    · Pain in Right Hip (M25.551)  · Stiffness of Right Hip, Not elsewhere classified (M25.651)  · Difficulty in walking, Not elsewhere classified (R26.2)      ASSESSMENT:     Ms. Maria A Jeffries presents with decreased strength and range of motion right lower extremity with decreased independence with functional mobility s/p aborted right roberta. Right roberta was unable to completed today. She was in a lot of pain this afternoon but agreed to get to the chair with RW and assistance. She felt better in the chair. Ice pack applied. Will see tomorrow to increase functional mobility before going home.  present. 10/7/21 - pt. Reports feeling better and plans to go home today. She had no questions or concerns.   She ambulated in the [place]ana with RW and did well, no steps at home and has a ramp. She did some right LE exercises listed below. Soreness with movement but did fine. Reviewed safety. This section established at most recent assessment   PROBLEM LIST (Impairments causing functional limitations):  1. Decreased Strength  2. Decreased ADL/Functional Activities  3. Decreased Transfer Abilities  4. Decreased Ambulation Ability/Technique  5. Increased Pain  6. Decreased Flexibility/Joint Mobility  7. Decreased [place] with Home Exercise Program   INTERVENTIONS PLANNED: (Benefits and precautions of physical therapy have been discussed with the patient.)  1. Bed Mobility  2. Cold  3. Gait Training  4. Home Exercise Program (HEP)  5. Range of Motion (ROM)  6. Therapeutic Activites  7. Therapeutic Exercise/Strengthening  8. Transfer Training     TREATMENT PLAN: Frequency/Duration: Follow patient BID for duration of hospital stay to address above goals. Rehabilitation Potential For Stated Goals: Good     RECOMMENDED REHABILITATION/EQUIPMENT: (at time of discharge pending progress): Continue Skilled Therapy and Home Health: Physical Therapy. HISTORY:   History of Present Injury/Illness (Reason for Referral):  Pt s/p ROBERTA on 10/6  Past Medical History/Comorbidities:   Ms. Kevyn Ritchie  has a past medical history of BMI 39.0-39.9,adult, CAD (coronary artery disease), Chronic pain, COVID-19 vaccine series completed (3/10/2021, 2/10/2021), Diabetes ([address] Diabetes mellitus type 2, diet-controlled ([address] (1/18/2017), Former smoker, GERD (gastroesophageal reflux disease), High cholesterol, History of anemia, Hypertension, Ill-defined condition, OA (osteoarthritis), Other ill-defined conditions(799.89), Other ill-defined conditions(799.89), Psychiatric disorder, Sleep apnea, Tachycardia, and V tach ([address]  She also has no past medical history of Coagulation defects, COPD, Dementia, Gastrointestinal disorder, Infectious disease, Neurological disorder, or Sleep disorder. Ms. [facility]  has a past surgical history that includes hx tubal ligation; pr breast surgery procedure unlisted; hx heart catheterization (2017); hx svt ablation (2014); hx cyst removal; hx cholecystectomy; and hx colonoscopy (). Social History/Living Environment:   Home Environment: Private residence  # Steps to Enter: 0  One/Two Story Residence: One story  Living Alone: No  Support Systems: Spouse/Significant Other  Patient Expects to be Discharged to[de-identified] [employer]  Tub or Shower Type: Shower    Prior Level of Function/Work/Activity:  independent   Number of Personal Factors/Comorbidities that affect the Plan of Care: 1-2: MODERATE COMPLEXITY   EXAMINATION:   Most Recent Physical Functioning:               RLE AROM  R Hip Flexion: 80  R Hip ABduction: 10  R Knee Extension: 0            Bed Mobility  Supine to Sit: Stand-by assistance    Transfers  Sit to Stand: Stand-by assistance  Stand to Sit: Stand-by assistance  Bed to Chair: Stand-by assistance    Balance  Sitting: Intact  Standing: With support              Weight Bearing Status  Right Side Weight Bearing: As tolerated  Distance (ft): 100 Feet (ft)  Ambulation - Level of Assistance: Stand-by assistance  Assistive Device: Walker, rolling  Speed/Susana: Delayed  Step Length: Left shortened;Right shortened  Stance: Right decreased  Gait Abnormalities: Antalgic  Interventions: Safety awareness training;Verbal cues     Braces/Orthotics:     Right Hip Cold  Type: Cold/ice packs      Body Structures Involved:  1. Joints  2. Muscles Body Functions Affected:  1. Movement Related Activities and Participation Affected:  1. Mobility  2.  Self Care   Number of elements that affect the Plan of Care: 4+: HIGH COMPLEXITY   CLINICAL PRESENTATION:   Presentation: Stable and uncomplicated: LOW COMPLEXITY   CLINICAL DECISION MAKIN [facility] [address] [address] AM-PAC 6 Clicks   Basic Mobility Inpatient Short Form  How much difficulty does the patient currently have. .. Unable A Lot A Little None   1. Turning over in bed (including adjusting bedclothes, sheets and blankets)? [] 1   [] 2   [x] 3   [] 4   2. Sitting down on and standing up from a chair with arms ( e.g., wheelchair, bedside commode, etc.)   [] 1   [] 2   [x] 3   [] 4   3. Moving from lying on back to sitting on the side of the bed? [] 1   [] 2   [x] 3   [] 4   How much help from another person does the patient currently need. .. Total A Lot A Little None   4. Moving to and from a bed to a chair (including a wheelchair)? [] 1   [] 2   [x] 3   [] 4   5. Need to walk in hospital room? [] 1   [x] 2   [] 3   [] 4   6. Climbing 3-5 steps with a railing? [] 1   [x] 2   [] 3   [] 4   © 2007, Trustees of [address] [place] [address] [address] [address], under license to [employer]. All rights reserved     Score:  Initial: 16 Most Recent: X (Date: -- )    Interpretation of Tool:  Represents activities that are increasingly more difficult (i.e. Bed mobility, Transfers, Gait). Medical Necessity:     · Patient is expected to demonstrate progress in   · strength, range of motion, and functional technique  ·  to   · decrease assistance required with functional mobility and ROBERTA management  · .  Reason for Services/Other Comments:  · Patient continues to require skilled intervention due to   · Inability to complete functional mobility and ROBERTA management independently  · . Use of outcome tool(s) and clinical judgement create a POC that gives a: Clear prediction of patient's progress: LOW COMPLEXITY            TREATMENT:   (In addition to Assessment/Re-Assessment sessions the following treatments were rendered)     Pre-treatment Symptoms/Complaints:  hip pain  Pain Initial:      Post Session:  did not rate     Gait Training (15 Minutes):  Gait training to improve and/or restore physical functioning as related to mobility, strength and balance.   Ambulated 100 Feet (ft) with Stand-by assistance using a Walker, rolling and minimal Safety awareness training;Verbal cues related to their stance phase to promote proper body alignment, promote proper body posture and promote proper body mechanics. Therapeutic Exercise: (15 Minutes):  Exercises per grid below to improve mobility and strength. Required minimal visual, verbal and manual cues to promote proper body alignment, promote proper body posture and promote proper body mechanics. Progressed range and repetitions as indicated. Date:  10/7 Date:   Date:     ACTIVITY/EXERCISE AM PM AM PM AM PM     []  []  []  []  []  []   Ankle Pumps 10        Quad Sets 10        Gluteal Sets 10        Hip ABd/ADduction 10        Knee Slides 10        Short Arc Quads         Long Arc Quads                                    B = bilateral; AA = active assistive; A = active; P = passive      Treatment/Session Assessment:     Response to Treatment:  Did fine, soreness with movement    Education:  [x] Home Exercises  [x] Fall Precautions  [x] Hip Precautions [x] D/C Instruction Review  [x] Hip Prosthesis Review  [x] Walker Management/Safety [] Adaptive Equipment as Needed       Interdisciplinary Collaboration:   o Registered Nurse    After treatment position/precautions:   o Up in chair  o Bed/Chair-wheels locked  o Bed in low position  o Call light within reach  o RN notified    Compliance with Program/Exercises: Compliant most of the time. Recommendations/Intent for next treatment session:  Treatment next visit will focus on increasing Ms. Church's independence with bed mobility, transfers, gait training, strength/ROM exercises, modalities for pain, and patient education.       Total Treatment Duration:  PT Patient Time In/Time Out  Time In: 0950  Time Out: [address] PT

## 2021-10-07 NOTE — PROGRESS NOTES
Shift assessment complete. Patient is alert and oriented, in bed. Respirations are even and unlabored, patient is on room air. Bowel sounds are present. Pedal pulse and sensation present. Patient is able to Dorsi and Plantar flex with ease. Patient has ambulated and is able to bear weight. Right extremity is appropriate in color and warm to touch. Patient has voided. No neurovascular deficit noted. No needs expressed at this time. Bed low and locked, call light within reach.   _____________________________________________________    Problem: Falls - Risk of  Goal: *Absence of Falls  Description: Document Pieter Jones Fall Risk and appropriate interventions in the flowsheet.   Outcome: Progressing Towards Goal  Note: Fall Risk Interventions:  Mobility Interventions: Communicate number of staff needed for ambulation/transfer    Medication Interventions: Patient to call before getting OOB, Teach patient to arise slowly    Elimination Interventions: Call light in reach, Elevated toilet seat, Patient to call for help with toileting needs, Stay With Me (per policy), Toilet paper/wipes in reach, Toileting schedule/hourly rounds    Problem: Hip Replacement: Day of Surgery/Unit  Goal: Nutrition/Diet  Outcome: Progressing Towards Goal  Goal: Respiratory  Outcome: Progressing Towards Goal  Goal: *Initiate mobility  Outcome: Progressing Towards Goal  Goal: *Optimal pain control at patient's stated goal  Outcome: Progressing Towards Goal  Goal: *Hemodynamically stable  Outcome: Progressing Towards Goal

## 2021-10-07 NOTE — PROGRESS NOTES
Has follow up appt scheduled with Dr Cheri Manzanares. Prescriptions were sent to pharmacy. Instructed to call doctor if having fever, excessive drainage, or other problems. I have reviewed discharge instructions with the patient. The patient verbalized understanding. Going to car via wheelchair.

## 2021-10-07 NOTE — PROGRESS NOTES
Medicated for pain with dilaudid po. Had PT. OT in to get pt up to shower. Bandage removed, incision intact with exofin.

## 2021-11-11 NOTE — DISCHARGE INSTRUCTIONS
Medication refill:    Medication Name: tizanidine    Medication last refilled: 09/10/2021    Provider: Dr. Pantoja    Pharmacy: [facility]    Last office visit: 11/09/2021     Follow up: Return in about 3 months (around 2/9/2022).    Last lab related to medication: n/a    Upcoming appointments: 02/09/2022    Refill outcome: Can not refill without MD authorization         Patient Education   ibuprofen 400 mg every 6 hours for pain. Tylenol 500-650 mg every 6 hours for pain. Prednisone as prescribed starting today. Follow-up with [facility] cardiology on Monday or Tuesday when called with appointment time. Call them late Monday morning or early Monday afternoon if you do not hear from them regarding her follow-up appointment. Call your primary care doctor Monday for follow-up appointment in the next 1-2 weeks as well. Return here over the weekend should you have any worsening or new or concerning symptoms. Pleurisy: Care Instructions  Your Care Instructions  Pleurisy is inflammation of the tissue that lines the inside of the chest and covers the lungs (pleura). Pleurisy is often caused by an infection, usually a virus. It also can be caused by other health problems, such as pneumonia or lupus. Pleurisy can cause sharp chest pain that gets worse when you cough or take a deep breath. You may need more tests to find out what is causing your pleurisy. Treatment depends on the cause. Pleurisy may come and go for a few days, or it may continue if the cause has not been treated. Home treatment can help ease symptoms. Follow-up care is a key part of your treatment and safety. Be sure to make and go to all appointments, and call your doctor if you are having problems. It's also a good idea to know your test results and keep a list of the medicines you take. How can you care for yourself at home? · Take an over-the-counter pain medicine, such as acetaminophen (Tylenol), ibuprofen (Advil, Motrin), or naproxen (Aleve). Read and follow all instructions on the label. · Do not take two or more pain medicines at the same time unless the doctor told you to. Many pain medicines have acetaminophen, which is Tylenol. Too much acetaminophen (Tylenol) can be harmful. · If your doctor prescribed antibiotics, take them as directed. Do not stop taking them just because you feel better.  You need to take the full course of antibiotics. · Take cough medicine as directed if your doctor recommends it. · Avoid activities that make the pain worse. When should you call for help? Call 911 anytime you think you may need emergency care. For example, call if:    · You have severe trouble breathing.     · You have severe chest pain.     · You passed out (lost consciousness).    Call your doctor now or seek immediate medical care if:    · You have a new or higher fever.    Watch closely for changes in your health, and be sure to contact your doctor if:    · You begin to cough up yellow or green mucus.     · You cough up blood.     · Your symptoms are not better in 3 or 4 days. Where can you learn more? Go to http://anibal-felix.info/. Enter F346 in the search box to learn more about \"Pleurisy: Care Instructions. \"  Current as of: September 5, 2018  Content Version: 11.9  © 8875-4419 [employer]. Care instructions adapted under license by [employer] (which disclaims liability or warranty for this information). If you have questions about a medical condition or this instruction, always ask your healthcare professional. [place] [address] any warranty or liability for your use of this information. Patient Education        Chest Pain: Care Instructions  Your Care Instructions    There are many things that can cause chest pain. Some are not serious and will get better on their own in a few days. But some kinds of chest pain need more testing and treatment. Your doctor may have recommended a follow-up visit in the next 8 to 12 hours. If you are not getting better, you may need more tests or treatment. Even though your doctor has released you, you still need to watch for any problems. The doctor carefully checked you, but sometimes problems can develop later.  If you have new symptoms or if your symptoms do not get better, get medical care right away.  If you have worse or different chest pain or pressure that lasts more than 5 minutes or you passed out (lost consciousness), call 911 or seek other emergency help right away. A medical visit is only one step in your treatment. Even if you feel better, you still need to do what your doctor recommends, such as going to all suggested follow-up appointments and taking medicines exactly as directed. This will help you recover and help prevent future problems. How can you care for yourself at home? · Rest until you feel better. · Take your medicine exactly as prescribed. Call your doctor if you think you are having a problem with your medicine. · Do not drive after taking a prescription pain medicine. When should you call for help? Call 911 if:    · You passed out (lost consciousness).     · You have severe difficulty breathing.     · You have symptoms of a heart attack. These may include:  ? Chest pain or pressure, or a strange feeling in your chest.  ? Sweating. ? Shortness of breath. ? Nausea or vomiting. ? Pain, pressure, or a strange feeling in your back, neck, jaw, or upper belly or in one or both shoulders or arms. ? Lightheadedness or sudden weakness. ? A fast or irregular heartbeat. After you call 911, the  may tell you to chew 1 adult-strength or 2 to 4 low-dose aspirin. Wait for an ambulance. Do not try to drive yourself.    Call your doctor today if:    · You have any trouble breathing.     · Your chest pain gets worse.     · You are dizzy or lightheaded, or you feel like you may faint.     · You are not getting better as expected.     · You are having new or different chest pain. Where can you learn more? Go to http://anibal-felix.info/. Enter A120 in the search box to learn more about \"Chest Pain: Care Instructions. \"  Current as of: September 23, 2018  Content Version: 11.9  © 0739-2953 [employer].  Care instructions adapted under license by [employer] (which disclaims liability or warranty for this information). If you have questions about a medical condition or this instruction, always ask your healthcare professional. [address] any warranty or liability for your use of this information.

## 2022-03-18 PROBLEM — I10 HTN (HYPERTENSION): Status: ACTIVE | Noted: 2017-01-18

## 2022-03-18 PROBLEM — E11.9 DIABETES MELLITUS TYPE 2, DIET-CONTROLLED (HCC): Status: ACTIVE | Noted: 2017-01-18

## 2022-03-19 PROBLEM — E66.01 SEVERE OBESITY (HCC): Status: ACTIVE | Noted: 2019-11-01

## 2022-03-19 PROBLEM — M16.11 OSTEOARTHRITIS OF RIGHT HIP: Status: ACTIVE | Noted: 2021-10-06

## 2022-03-19 PROBLEM — R07.9 CHEST PAIN: Status: ACTIVE | Noted: 2017-01-18

## 2022-03-19 PROBLEM — Z72.0 TOBACCO ABUSE: Status: ACTIVE | Noted: 2017-01-18

## 2022-03-19 PROBLEM — E78.5 DYSLIPIDEMIA: Status: ACTIVE | Noted: 2017-01-18

## 2022-03-20 PROBLEM — I47.1 SVT (SUPRAVENTRICULAR TACHYCARDIA) (HCC): Status: ACTIVE | Noted: 2017-01-19

## 2022-03-20 PROBLEM — I47.10 SVT (SUPRAVENTRICULAR TACHYCARDIA): Status: ACTIVE | Noted: 2017-01-19

## 2022-05-24 ENCOUNTER — TELEPHONE (OUTPATIENT)
Dept: CARDIOLOGY CLINIC | Age: 52
End: 2022-05-24

## 2022-05-24 DIAGNOSIS — I47.1 SVT (SUPRAVENTRICULAR TACHYCARDIA) (HCC): ICD-10-CM

## 2022-05-24 DIAGNOSIS — I47.1 ATRIAL TACHYCARDIA (HCC): Primary | ICD-10-CM

## 2022-05-24 NOTE — TELEPHONE ENCOUNTER
Pt states  bpm ~20 min today. C/o light headed, dizzy, palpitations, little sob. Denies near syncope. Sx resolved with rest. Did nto check BP. 1 C coffee this AM. Usually drinks 3-5 jenkins daily. Appt 6/14/22 8:15a Dr. Malinda Lundborg. Reduce caffeine, increase po hydration with water. /> for 1 hr or more, to ER. Pt voiced understanding and thanks.  cgh

## 2022-05-24 NOTE — TELEPHONE ENCOUNTER
Pt called stating her HR has been running high again and wanted to schedule an appt to see Dr. Nicole Downey.

## 2022-05-25 NOTE — TELEPHONE ENCOUNTER
----- Message from Lydia Nair MD sent at 5/25/2022  7:15 AM EDT -----  If she can get labs that will make the decision if she needs hospital based care before she sees me so not a bad idea. If she comes in for that, would advise a nursing assessment with an ECG.     CARL

## 2022-05-25 NOTE — TELEPHONE ENCOUNTER
Informed pt of Dr. Tena Pollock response. Appt Thurs 5/26/22 11a [facility] Adrien. Informed pt may get labs before or after, whichever is most convenient. Non-fasting. FU 6/14/22 Dr. Berto Glover. Pt voiced understanding and thanks.  cgh

## 2022-05-25 NOTE — TELEPHONE ENCOUNTER
MD Aly Myles LPN  Caller: Unspecified (Today,  7:15 AM)  If she can get labs that will make the decision if she needs hospital based care before she sees me so not a bad idea. If she comes in for that, would advise a nursing assessment with an ECG. CARL MARTINEZ for pt to return call.    Orders Placed This Encounter   Procedures    Basic Metabolic Panel     Standing Status:   Future     Standing Expiration Date:   5/25/2023    CBC     Standing Status:   Future     Standing Expiration Date:   5/25/2023    Magnesium     Standing Status:   Future     Standing Expiration Date:   5/25/2023    TSH     Standing Status:   Future     Standing Expiration Date:   5/25/2023

## 2022-05-26 ENCOUNTER — NURSE ONLY (OUTPATIENT)
Dept: CARDIOLOGY CLINIC | Age: 52
End: 2022-05-26
Payer: MEDICAID

## 2022-05-26 VITALS
WEIGHT: 240 LBS | HEIGHT: 66 IN | HEART RATE: 69 BPM | BODY MASS INDEX: 38.57 KG/M2 | SYSTOLIC BLOOD PRESSURE: 120 MMHG | DIASTOLIC BLOOD PRESSURE: 63 MMHG

## 2022-05-26 DIAGNOSIS — I47.1 ATRIAL TACHYCARDIA (HCC): Primary | ICD-10-CM

## 2022-05-26 PROCEDURE — 93000 ELECTROCARDIOGRAM COMPLETE: CPT | Performed by: INTERNAL MEDICINE

## 2022-05-26 NOTE — PROGRESS NOTES
Pt came in for ekg per Dr. Luba Hancock in triage note. Pt has fu with Dr. Luba Hancock on 6/14/22. Ekg ran & presented to Dr. Tiffany Olivas since Dr. Luba Hancock is out of the offfice today. Per Dr. Tiffany Olivas: ekg shows NSR. Place 7 day holter & keep fu with Dr. Luba Hancock. Monitor placed, registered. Instructions given to pt & understanding voiced.

## 2022-06-02 DIAGNOSIS — I47.1 ATRIAL TACHYCARDIA (HCC): ICD-10-CM

## 2022-06-02 DIAGNOSIS — I47.1 SVT (SUPRAVENTRICULAR TACHYCARDIA) (HCC): ICD-10-CM

## 2022-06-02 LAB
ANION GAP SERPL CALC-SCNC: 10 MMOL/L (ref 7–16)
BUN SERPL-MCNC: 8 MG/DL (ref 6–23)
CALCIUM SERPL-MCNC: 9.9 MG/DL (ref 8.3–10.4)
CHLORIDE SERPL-SCNC: 107 MMOL/L (ref 98–107)
CO2 SERPL-SCNC: 24 MMOL/L (ref 21–32)
CREAT SERPL-MCNC: 0.9 MG/DL (ref 0.6–1)
ERYTHROCYTE [DISTWIDTH] IN BLOOD BY AUTOMATED COUNT: 14.5 % (ref 11.9–14.6)
GLUCOSE SERPL-MCNC: 141 MG/DL (ref 65–100)
HCT VFR BLD AUTO: 42.9 % (ref 35.8–46.3)
HGB BLD-MCNC: 13.4 G/DL (ref 11.7–15.4)
MAGNESIUM SERPL-MCNC: 2.2 MG/DL (ref 1.8–2.4)
MCH RBC QN AUTO: 26.9 PG (ref 26.1–32.9)
MCHC RBC AUTO-ENTMCNC: 31.2 G/DL (ref 31.4–35)
MCV RBC AUTO: 86 FL (ref 79.6–97.8)
NRBC # BLD: 0 K/UL (ref 0–0.2)
PLATELET # BLD AUTO: 483 K/UL (ref 150–450)
PMV BLD AUTO: 10.3 FL (ref 9.4–12.3)
POTASSIUM SERPL-SCNC: 4.2 MMOL/L (ref 3.5–5.1)
RBC # BLD AUTO: 4.99 M/UL (ref 4.05–5.2)
SODIUM SERPL-SCNC: 141 MMOL/L (ref 136–145)
TSH, 3RD GENERATION: 2.32 UIU/ML (ref 0.36–3.74)
WBC # BLD AUTO: 12.8 K/UL (ref 4.3–11.1)

## 2022-06-14 ENCOUNTER — OFFICE VISIT (OUTPATIENT)
Dept: CARDIOLOGY CLINIC | Age: 52
End: 2022-06-14
Payer: COMMERCIAL

## 2022-06-14 VITALS
SYSTOLIC BLOOD PRESSURE: 128 MMHG | DIASTOLIC BLOOD PRESSURE: 88 MMHG | HEIGHT: 66 IN | WEIGHT: 248 LBS | BODY MASS INDEX: 39.86 KG/M2 | HEART RATE: 71 BPM

## 2022-06-14 DIAGNOSIS — I47.1 ATRIAL TACHYCARDIA (HCC): Primary | ICD-10-CM

## 2022-06-14 PROCEDURE — 93000 ELECTROCARDIOGRAM COMPLETE: CPT | Performed by: INTERNAL MEDICINE

## 2022-06-14 PROCEDURE — 99214 OFFICE O/P EST MOD 30 MIN: CPT | Performed by: INTERNAL MEDICINE

## 2022-06-14 NOTE — PROGRESS NOTES
[facility] CARDIOLOGY  [address], [address]  PHONE: [phone]        22    NAME:  Carlos Manuel Baugh  : 1970  MRN: [id number]     Follow Up    ASSESSMENT and PLAN:   1. SVT (supraventricular tachycardia) ([address]   2. Severe obesity ([address]   3. DMII   4. Tobacco abuse, recently stopped smoking in 211      47year old female with a history of SVT s/p ablation. Now with recurrent palpitations, but feeling better now. -SVT - continue metoprolol. Monitor benign.    -Monitor reviewed with patient, WNL. Blood work WNL as well. -HCM - goal LDL <100 given DMII, may need to consider statin in future. Normal LHC in . LDL currently 114, 2022.   -EP follow up in 1 year or PRN. Patient has been instructed and agrees to call our office with any issues or other concerns related to their cardiac condition(s) and/or complaint(s). No follow-up provider specified. Thank you for allowing me to participate in the electrophysiologic care of Ms. Carlos Manuel Baugh. Please contact me if any questions or concerns were to arise. Rene Olson MD, MS  Clinical Cardiac Electrophysiology  [facility] Cardiology  22  8:34 AM    ===================================================================  Chief Complant:    Chief Complaint   Patient presents with    Tachycardia    Results     monitor        Consultation is requested by No ref. provider found for evaluation of Tachycardia and Results (monitor)    History:  Carlos Manuel Baugh is a most pleasant 47 y.o. female with a past medical and cardiac history significant for SVT s/p ablation , HTN, dyslipidemia, tobacco abuse 1/2 PPD for 20+ years, DM II- diet controlled and strong family h/o premature CAD who presents to the ER in 2017 with c/o chest pain. She underwent LHC by Dr. Chico Herrera at that time with normal coronaries. She comes in for follow up.  She recently noted her HR being elevated while walking to the kitchen in May. Her HR was 147 bpm. The patient otherwise denies chest pain, dyspnea, presyncope, syncope or lateralizing symptoms. Reviewed monitor and echo both of which appeared normal.       Cardiac PMH: (Old records have been reviewed and summarized below)      EKG:  (EKG has been independently visualized by me with interpretation below): NSR, normal axis, no ischemia       Monitor: 12/2019, grossly normal. Pt with brief second degree HB. Monitor: 5/2022, Ziopatch, WNL. ECHO: n/a       Previous Heart Catheterization: 1/2017   ANGIOGRAPHIC RESULTS      1. Left main coronary artery: Large-caliber vessel. Angiographically normal.     2. LAD: Medium- to large-caliber vessel. Angiographically normal.     3. Left circumflex: Medium-caliber, codominant vessel. Angiographically normal.     4. First obtuse marginal artery: Medium-caliber vessel. Normal.     5. Second obtuse marginal artery: Small-caliber vessels. Normal.     6. Right coronary artery: Medium-caliber, codominant vessel. Angiographically normal.     7. Right PDA: Small-caliber vessel. Normal.     8. Left ventriculogram performed in the SIMPSON projection showed    normal left ventricular systolic function, EF 99% to 65%. No    focal segmental wall motion abnormalities.        CONCLUSIONS     1. Normal coronary arteries. 2. Normal left ventricular systolic function. Stress Test: n/a     Past Medical History, Past Surgical History, Family history, Social History, and Medications were all reviewed with the patient today and updated as necessary. Current Outpatient Medications   Medication Sig Dispense Refill    EPINEPHrine (EPIPEN) 0.3 MG/0.3ML SOAJ injection Inject 0.3 mg into the muscle once as needed      gabapentin (NEURONTIN) 300 MG capsule Take 300 mg by mouth 2 times daily.       hydroCHLOROthiazide (HYDRODIURIL) 25 MG tablet Take 25 mg by mouth daily      metFORMIN (GLUCOPHAGE-XR) 500 MG extended release tablet Take 500 mg by mouth      metoprolol succinate (TOPROL XL) 50 MG extended release tablet Take 50 mg by mouth daily      montelukast (SINGULAIR) 10 MG tablet Take 10 mg by mouth      omeprazole (PRILOSEC) 40 MG delayed release capsule Take 40 mg by mouth daily      senna-docusate (PERICOLACE) 8.6-50 MG per tablet Take 1 tablet by mouth daily      venlafaxine (EFFEXOR XR) 150 MG extended release capsule Take 150 mg by mouth daily       No current facility-administered medications for this visit. Allergies   Allergen Reactions    Naproxen Itching    Tramadol Itching       Past Medical History:   Diagnosis Date    BMI 39.0-39.9,adult     CAD (coronary artery disease)     per heart cath (1/18/17)= Normal coronary arteries.  Chronic pain     Back    COVID-19 vaccine series completed 3/10/2021, 2/10/2021    Moderna     Diabetes ([facility]     type 2; takes metformin prn; checks bs once a week; average 130-140; sx of low <70; A1C=6.2 on 9/14/21    Diabetes mellitus type 2, diet-controlled ([facility] 1/18/2017    Former smoker     stopped on 9/1/21    GERD (gastroesophageal reflux disease)     daily med for control     High cholesterol     no meds     History of anemia     Hypertension     on med for control     Ill-defined condition     DDD    OA (osteoarthritis)     Other ill-defined conditions(799.89)     pain rt breast    Other ill-defined conditions(799.89)     chronic back pain    Psychiatric disorder     depression     Sleep apnea     c-pap nightly; instructed to bring dos    Tachycardia     SVT; s/p ablation in 2014; followed by [facility]     [facility])      Past Surgical History:   Procedure Laterality Date    ABLATION OF DYSRHYTHMIC FOCUS  11/23/2014    BREAST SURGERY      excision right breast ducts    CARDIAC CATHETERIZATION  01/18/2017    Normal coronary arteries.      CHOLECYSTECTOMY      COLONOSCOPY  2021    CYST REMOVAL      left hand  ganglion  TUBAL LIGATION       Family History   Problem Relation Age of Onset    Stroke Father     Coronary Art Dis Mother     Breast Cancer Maternal Aunt 28    Coronary Art Dis Brother     Pseudochol. Deficiency Neg Hx     Malig Hypertherm Neg Hx     Delayed Awakening Neg Hx     Post-op Nausea/Vomiting Neg Hx     Emergence Delirium Neg Hx     Post-op Cognitive Dysfunction Neg Hx     Diabetes Mother     Other Neg Hx      Social History     Tobacco Use    Smoking status: Former Smoker     Packs/day: 0.50     Quit date: 2021     Years since quittin.7    Smokeless tobacco: Never Used   Substance Use Topics    Alcohol use: Not Currently       ROS:  A comprehensive review of systems was performed with the pertinent positives and negatives as noted in the HPI in addition to:  Review of Systems      PHYSICAL EXAM:   /88   Pulse 71   Ht 5' 6\" (1.676 m)   Wt 248 lb (112.5 kg)   BMI 40.03 kg/m²      Wt Readings from Last 3 Encounters:   22 248 lb (112.5 kg)   22 240 lb (108.9 kg)   21 240 lb (108.9 kg)     BP Readings from Last 3 Encounters:   22 128/88   22 120/63   21 126/82       Gen: Well appearing, well developed, no acute distress  Eyes: Pupils equal, round. Extraocular movements are intact  ENT: Oropharynx clear, no oral lesions, normal dentition  CV: S1S2, regular rate and rhythm, no murmurs, rubs or gallops, normal JVD, no carotid bruits, normal distal pulses, no FAMILIA  Pulm: Clear to auscultation bilaterally, no accessory muscle uses, no wheezes or rales  GI: Soft, NT, ND, +BS  Neuro: Alert and oriented, nonfocal  Psych: Appropriate affect  Skin: Normal color and skin turgor  MSK: Normal muscle bulk and tone    Medical problems and test results were reviewed with the patient today. No results found for any visits on 22.

## 2022-08-29 NOTE — PROGRESS NOTES
2021         Post Op day: 1 Day Post-Op     Admit Date: 10/6/2021    Admit Diagnosis: Primary osteoarthritis of right hip [M16.11]  Osteoarthritis of right hip [M16.11]        Subjective: Patient stable s/p aborted right total hip arthroplasty. No acute events.       Objective:     Visit Vitals  /65 (BP 1 Location: Right upper arm, BP Patient Position: At rest)   Pulse 63   Temp 99.2 °F (37.3 °C)   Resp 20   SpO2 98%    Temp (24hrs), Av.3 °F (36.8 °C), Min:97.3 °F (36.3 °C), Max:99.2 °F (37.3 °C)      Lab Results   Component Value Date/Time    HGB 11.1 (L) 10/07/2021 04:39 AM       Patient Active Problem List   Diagnosis Code    Atrial tachycardia (HCC) I47.1    Chest pain R07.9    HTN (hypertension) I10    Dyslipidemia E78.5    Diabetes mellitus type 2, diet-controlled (HCC) E11.9    Tobacco abuse Z72.0    SVT (supraventricular tachycardia) ([facility]) I47.1    Severe obesity ([facility]) E66.01    Osteoarthritis of right hip M16.11       Current Facility-Administered Medications   Medication Dose Route Frequency    albuterol (PROVENTIL VENTOLIN) nebulizer solution 2.5 mg  2.5 mg Nebulization Q6H PRN    gabapentin (NEURONTIN) capsule 300 mg  300 mg Oral QHS    hydroCHLOROthiazide (HYDRODIURIL) tablet 25 mg  25 mg Oral DAILY    metFORMIN ER (GLUCOPHAGE XR) tablet 500 mg  500 mg Oral DAILY WITH DINNER    metoprolol succinate (TOPROL-XL) XL tablet 50 mg  50 mg Oral DAILY    montelukast (SINGULAIR) tablet 10 mg  10 mg Oral QHS    pantoprazole (PROTONIX) tablet 40 mg  40 mg Oral ACB    venlafaxine-SR (EFFEXOR-XR) capsule 150 mg  150 mg Oral DAILY    insulin lispro (HUMALOG) injection   SubCUTAneous ACB&D    alcohol 62% (NOZIN) nasal  1 Ampule  1 Ampule Topical Q12H    0.9% sodium chloride infusion  100 mL/hr IntraVENous CONTINUOUS    sodium chloride (NS) flush 5-40 mL  5-40 mL IntraVENous Q8H    sodium chloride (NS) flush 5-40 mL  5-40 mL IntraVENous PRN    acetaminophen (TYLENOL) tablet 1,000 mg  1,000 mg Oral Q6H    HYDROmorphone (DILAUDID) tablet 2 mg  2 mg Oral Q4H PRN    HYDROmorphone (DILAUDID) injection 1 mg  1 mg IntraVENous Q3H PRN    naloxone (NARCAN) injection 0.2-0.4 mg  0.2-0.4 mg IntraVENous Q10MIN PRN    ondansetron (ZOFRAN ODT) tablet 4 mg  4 mg Oral Q4H PRN    diphenhydrAMINE (BENADRYL) capsule 25 mg  25 mg Oral Q4H PRN    senna-docusate (PERICOLACE) 8.6-50 mg per tablet 2 Tablet  2 Tablet Oral DAILY    aspirin delayed-release tablet 81 mg  81 mg Oral Q12H    cyclobenzaprine (FLEXERIL) tablet 5 mg  5 mg Oral TID PRN    ondansetron (ZOFRAN ODT) tablet 8 mg  8 mg Oral Q8H PRN    zolpidem (AMBIEN) tablet 5 mg  5 mg Oral QHS PRN       Extremity Exam  Dressing clean and dry   Tibialis Anterior and Gastroc-Soleus functioning normally right lower extremity  Sensation intact to light touch on operative limb  Extremity perfused  TEDS/SCDS in place  No sign of DVT     Assessment / Plan :  WBAT RLE  Continue PT/OT  Continue current DVT prophylaxis in house.  Discharge on ASA BID  DIspo-HH         Signed By: Madgiel Fajardo NP Lateral

## 2022-10-13 RX ORDER — OMEPRAZOLE 40 MG/1
CAPSULE, DELAYED RELEASE ORAL
Qty: 30 CAPSULE | Refills: 2 | OUTPATIENT
Start: 2022-10-13

## 2023-06-05 ENCOUNTER — OFFICE VISIT (OUTPATIENT)
Age: 53
End: 2023-06-05
Payer: MEDICAID

## 2023-06-05 VITALS
DIASTOLIC BLOOD PRESSURE: 78 MMHG | WEIGHT: 246 LBS | SYSTOLIC BLOOD PRESSURE: 118 MMHG | BODY MASS INDEX: 39.53 KG/M2 | HEIGHT: 66 IN | HEART RATE: 74 BPM

## 2023-06-05 DIAGNOSIS — I47.1 ATRIAL TACHYCARDIA (HCC): Primary | ICD-10-CM

## 2023-06-05 PROCEDURE — 93000 ELECTROCARDIOGRAM COMPLETE: CPT | Performed by: INTERNAL MEDICINE

## 2023-06-05 PROCEDURE — 3078F DIAST BP <80 MM HG: CPT | Performed by: INTERNAL MEDICINE

## 2023-06-05 PROCEDURE — 99214 OFFICE O/P EST MOD 30 MIN: CPT | Performed by: INTERNAL MEDICINE

## 2023-06-05 PROCEDURE — 3074F SYST BP LT 130 MM HG: CPT | Performed by: INTERNAL MEDICINE

## 2023-06-05 ASSESSMENT — ENCOUNTER SYMPTOMS
EYES NEGATIVE: 1
GASTROINTESTINAL NEGATIVE: 1
RESPIRATORY NEGATIVE: 1
ALLERGIC/IMMUNOLOGIC NEGATIVE: 1

## 2023-06-05 NOTE — PROGRESS NOTES
FAMILIA  Pulm: Clear to auscultation bilaterally, no accessory muscle uses, no wheezes or rales  GI: Soft, NT, ND, +BS  Neuro: Alert and oriented, nonfocal  Psych: Appropriate affect  Skin: Normal color and skin turgor  MSK: Normal muscle bulk and tone    Medical problems and test results were reviewed with the patient today. No results found for any visits on 06/05/23.

## 2024-01-23 ENCOUNTER — TELEPHONE (OUTPATIENT)
Age: 54
End: 2024-01-23

## 2024-01-23 NOTE — TELEPHONE ENCOUNTER
Patient having Surgical removal of one tooth using W anesthesia for irregular  heartbeat on TBD . Requesting cardiac clearance and any medication hold. Fax: [phone]

## 2024-01-26 ENCOUNTER — TELEPHONE (OUTPATIENT)
Age: 54
End: 2024-01-26

## 2024-01-26 NOTE — TELEPHONE ENCOUNTER
Opal with [facility] called stating to please refax the surgical clearance from Chart Note on 1/23.      Att: Opal    Tele : [phone]

## 2024-10-09 ENCOUNTER — HOSPITAL ENCOUNTER (OUTPATIENT)
Dept: MRI IMAGING | Age: 54
Discharge: HOME | End: 2024-10-12
Payer: MEDICAID

## 2024-10-09 DIAGNOSIS — M54.16 LUMBAR RADICULOPATHY: ICD-10-CM

## 2024-10-09 PROCEDURE — 72148 MRI LUMBAR SPINE W/O DYE: CPT

## 2025-02-27 ENCOUNTER — HOSPITAL ENCOUNTER (EMERGENCY)
Age: 55
Discharge: HOME | End: 2025-02-27
Attending: EMERGENCY MEDICINE
Payer: MEDICAID

## 2025-02-27 ENCOUNTER — APPOINTMENT (OUTPATIENT)
Dept: GENERAL RADIOLOGY | Age: 55
End: 2025-02-27
Payer: MEDICAID

## 2025-02-27 VITALS
RESPIRATION RATE: 16 BRPM | WEIGHT: 240 LBS | SYSTOLIC BLOOD PRESSURE: 142 MMHG | DIASTOLIC BLOOD PRESSURE: 69 MMHG | HEART RATE: 75 BPM | OXYGEN SATURATION: 96 % | BODY MASS INDEX: 38.57 KG/M2 | HEIGHT: 66 IN | TEMPERATURE: 97.7 F

## 2025-02-27 DIAGNOSIS — S20.211A CONTUSION OF RIB ON RIGHT SIDE, INITIAL ENCOUNTER: Primary | ICD-10-CM

## 2025-02-27 PROCEDURE — 6360000002 HC RX W HCPCS: Performed by: EMERGENCY MEDICINE

## 2025-02-27 PROCEDURE — 71101 X-RAY EXAM UNILAT RIBS/CHEST: CPT

## 2025-02-27 PROCEDURE — 99283 EMERGENCY DEPT VISIT LOW MDM: CPT

## 2025-02-27 PROCEDURE — 96372 THER/PROPH/DIAG INJ SC/IM: CPT

## 2025-02-27 RX ORDER — KETOROLAC TROMETHAMINE 30 MG/ML
60 INJECTION, SOLUTION INTRAMUSCULAR; INTRAVENOUS
Status: COMPLETED | OUTPATIENT
Start: 2025-02-27 | End: 2025-02-27

## 2025-02-27 RX ADMIN — KETOROLAC TROMETHAMINE 60 MG: 60 INJECTION, SOLUTION INTRAMUSCULAR at 10:47

## 2025-02-27 ASSESSMENT — PAIN DESCRIPTION - LOCATION: LOCATION: RIB CAGE

## 2025-02-27 ASSESSMENT — PAIN SCALES - GENERAL
PAINLEVEL_OUTOF10: 6
PAINLEVEL_OUTOF10: 8
PAINLEVEL_OUTOF10: 7

## 2025-02-27 ASSESSMENT — LIFESTYLE VARIABLES
HOW OFTEN DO YOU HAVE A DRINK CONTAINING ALCOHOL: NEVER
HOW MANY STANDARD DRINKS CONTAINING ALCOHOL DO YOU HAVE ON A TYPICAL DAY: PATIENT DOES NOT DRINK

## 2025-02-27 ASSESSMENT — PAIN - FUNCTIONAL ASSESSMENT: PAIN_FUNCTIONAL_ASSESSMENT: 0-10

## 2025-02-27 ASSESSMENT — PAIN DESCRIPTION - ORIENTATION: ORIENTATION: RIGHT

## 2025-02-27 NOTE — ED PROVIDER NOTES
pneumothorax.    Right rib series performed and interpreted by me shows no rib fracture, pneumothorax, or pulmonary contusion.  There are no pleural effusions.    Primary diagnosis is musculoskeletal chest pain and rib contusion.  Patient to be discharged home continue her Percocet as needed, and supplement with 2 Aleve every 12 hours     Procedures     Procedures    Orders placed during this emergency department visit:     Orders Placed This Encounter   Procedures    XR RIBS RIGHT INCLUDE CHEST (MIN 3 VIEWS)        Medications given during this emergency department visit:     Medications   ketorolac (TORADOL) injection 60 mg (60 mg IntraMUSCular Given 2/27/25 1047)       New prescriptions:     Discharge Medication List as of 2/27/2025 11:22 AM           Past History and Complexity:     Past Medical History:   Diagnosis Date    BMI 39.0-39.9,adult     CAD (coronary artery disease)     per heart cath (1/18/17)= Normal coronary arteries.     Chronic pain     Back    COVID-19 vaccine series completed 3/10/2021, 2/10/2021    Moderna     Diabetes ([facility])     type 2; takes metformin prn; checks bs once a week; average 130-140; sx of low <70; A1C=6.2 on 9/14/21    Diabetes mellitus type 2, diet-controlled ([facility]) 1/18/2017    Former smoker     stopped on 9/1/21    GERD (gastroesophageal reflux disease)     daily med for control     High cholesterol     no meds     History of anemia     Hypertension     on med for control     Ill-defined condition     DDD    OA (osteoarthritis)     Other ill-defined conditions(799.89)     pain rt breast    Other ill-defined conditions(799.89)     chronic back pain    Psychiatric disorder     depression     Sleep apnea     c-pap nightly; instructed to bring dos    Tachycardia     SVT; s/p ablation in 2014; followed by [facility]     V tach ([facility])         Past Surgical History:   Procedure Laterality Date    ABLATION OF DYSRHYTHMIC FOCUS  11/23/2014    BREAST SURGERY      excision right

## 2025-02-27 NOTE — DISCHARGE INSTRUCTIONS
Take 2 Aleve every 12 hours in combination with your Percocet pain medicine  Return to the ER if worse in any way

## 2025-02-27 NOTE — ED NOTES
Patient mobility status  with no difficulty.     I have reviewed discharge instructions with the patient.  The patient verbalized understanding.    Patient left ED via Discharge Method: ambulatory to Home with  self .    Opportunity for questions and clarification provided.     Patient given 0 scripts.           Sujata Nieves LPN  02/27/25 1125

## 2025-02-27 NOTE — ED TRIAGE NOTES
Patient a 56 y/o Female reports to the ED with c/c of a fall on Monday night. States she tripped over a curb sign. States pain to the right ribs.

## 2025-02-27 NOTE — DISCHARGE INSTR - COC
Continuity of Care Form    Patient Name: Marilu Flores   :  1970  MRN:  [id number]    Admit date:  2025  Discharge date:  ***    Code Status Order: No Order   Advance Directives:   Advance Care Flowsheet Documentation             Admitting Physician:  No admitting provider for patient encounter.  PCP: Ron Pacheco DO    Discharging Nurse: ***  Discharging Hospital Unit/Room#: D03/D03  Discharging Unit Phone Number: ***    Emergency Contact:   Extended Emergency Contact Information  Primary Emergency Contact: Vikram Flores  Address: [address] [place] [id number] [place]  Home Phone: [phone]  Mobile Phone: [phone]  Relation: Spouse    Past Surgical History:  Past Surgical History:   Procedure Laterality Date    ABLATION OF DYSRHYTHMIC FOCUS  2014    BREAST SURGERY      excision right breast ducts    CARDIAC CATHETERIZATION  2017    Normal coronary arteries.     CHOLECYSTECTOMY      COLONOSCOPY      CYST REMOVAL      left hand  ganglion    TUBAL LIGATION         Immunization History:   Immunization History   Administered Date(s) Administered    COVID-19, MODERNA BLUE border, Primary or Immunocompromised, (age 12y+), IM, 100 mcg/0.5mL 02/10/2021, 03/10/2021    Influenza Trivalent 2014    Pneumococcal, PPSV23, PNEUMOVAX 23, (age 2y+), SC/IM, 0.5mL 2014       Active Problems:  Patient Active Problem List   Diagnosis Code    Diabetes mellitus type 2, diet-controlled (HCC) E11.9    HTN (hypertension) I10    Severe obesity E66.01    Chest pain R07.9    Dyslipidemia E78.5    Tobacco abuse Z72.0    Atrial tachycardia I47.19    Osteoarthritis of right hip M16.11    SVT (supraventricular tachycardia) I47.10       Isolation/Infection:   Isolation            No Isolation          Patient Infection Status       None to display            Nurse Assessment:  Last Vital Signs: BP (!) 142/69   Pulse 75   Temp 97.7 °F (36.5 °C)

## (undated) DEVICE — SUTURE STRATAFIX SZ 3-0 L30CM NONABSORBABLE UD L19MM FS-2 SXMP2B408

## (undated) DEVICE — PIN FIX TROCAR PT 1 END 9/64X9 IN 1 PT STYL SMOOTH PLN STRL
Type: IMPLANTABLE DEVICE | Site: HIP | Status: NON-FUNCTIONAL
Removed: 2021-10-06

## (undated) DEVICE — SUTURE FIBERWIRE SZ 2 W/ TAPERED NEEDLE BLUE L38IN NONABSORB BLU L26.5MM 1/2 CIRCLE AR7200

## (undated) DEVICE — Z DISCONTINUED USE 2423037 APPLICATOR MEDICATED 3 CC CHLORHEXIDINE GLUC 2% CHLORAPREP

## (undated) DEVICE — HANDPIECE SET WITH COAXIAL HIGH FLOW TIP AND SUCTION TUBE: Brand: INTERPULSE

## (undated) DEVICE — GOWN,AURORA,FABRIC-REINFORCED,2XL: Brand: MEDLINE

## (undated) DEVICE — SOLUTION IV 1000ML 0.9% SOD CHL

## (undated) DEVICE — SYSTEM SKIN CLSR 22CM DERMBND PRINEO

## (undated) DEVICE — 3M™ STERI-DRAPE™ INSTRUMENT POUCH 1018: Brand: STERI-DRAPE™

## (undated) DEVICE — SUTURE STRATAFIX SPRL SZ 1 L5IN ABSRB VLT CT-1 L36MM 1/2 SXPD2B401

## (undated) DEVICE — SOLUTION IRRIG 3000ML 0.9% SOD CHL FLX CONT 0797208] ICU MEDICAL INC]

## (undated) DEVICE — SOLUTION IV 250ML 0.9% SOD CHL CLR INJ FLX BG CONT PRT CLSR

## (undated) DEVICE — TRAY PREP DRY W/ PREM GLV 2 APPL 6 SPNG 2 UNDPD 1 OVERWRAP

## (undated) DEVICE — 450 ML BOTTLE OF 0.05% CHLORHEXIDINE GLUCONATE IN 99.95% STERILE WATER FOR IRRIGATION, USP AND APPLICATOR.: Brand: IRRISEPT ANTIMICROBIAL WOUND LAVAGE

## (undated) DEVICE — (D)PREP SKN CHLRAPRP APPL 26ML -- CONVERT TO ITEM 371833

## (undated) DEVICE — REM POLYHESIVE ADULT PATIENT RETURN ELECTRODE: Brand: VALLEYLAB

## (undated) DEVICE — ABDOMINAL PAD: Brand: DERMACEA

## (undated) DEVICE — PAD,NON-ADHERENT,3X8,STERILE,LF,1/PK: Brand: MEDLINE

## (undated) DEVICE — DRAPE,U/SHT,SPLIT,FILM,60X84,STERILE: Brand: MEDLINE

## (undated) DEVICE — STRYKER PERFORMANCE SERIES SAGITTAL BLADE: Brand: STRYKER PERFORMANCE SERIES

## (undated) DEVICE — 3M™ IOBAN™ 2 ANTIMICROBIAL INCISE DRAPE 6651EZ: Brand: IOBAN™ 2

## (undated) DEVICE — T4 HOOD

## (undated) DEVICE — STOCKINETTE TUBE 6X48 -- MEDICHOICE

## (undated) DEVICE — BIPOLAR SEALER 23-112-1 AQM 6.0: Brand: AQUAMANTYS ®

## (undated) DEVICE — SUTURE MCRYL SZ 2-0 L27IN ABSRB UD CP-1 1 L36MM 1/2 CIR REV Y266H

## (undated) DEVICE — JELLY LUBRICATING 10GM PREFIL SYR LUBE

## (undated) DEVICE — TOTAL HIP DR WATSON: Brand: MEDLINE INDUSTRIES, INC.

## (undated) DEVICE — SYR 10ML LUER LOK 1/5ML GRAD --